# Patient Record
(demographics unavailable — no encounter records)

---

## 2025-03-01 NOTE — DVH
EXAM:  CT Abdomen and Pelvis Without Intravenous Contrast



CLINICAL INDICATION:   abd pain



TECHNIQUE:  Axial computed tomography images of the abdomen and pelvis without intravenous contrast. 
 This CT exam was performed using one or more of the following dose reduction techniques:  automated 
exposure control, adjustment of the mA and/or kV according to patient size, and/or use of iterative r
econstruction technique.



CONTRAST:       



COMPARISON:   CT ABD PELVIS WO CONTRAST on DOS: 8/10/21



FINDINGS:



 LUNG BASES:  See below.  



 PLEURAL SPACE:  Right pleural effusion with compressive atelectasis.



 MEDIASTINUM:  Small hiatal hernia.



ABDOMEN:



 LIVER:  See below.  



 GALLBLADDER AND BILE DUCTS:  Gallbladder is surgically absent.  No ductal dilation.



 PANCREAS:  Unremarkable.  No ductal dilation.



 SPLEEN:  Unremarkable.  No splenomegaly.



 ADRENALS:  Unremarkable.  No mass.



 KIDNEYS AND URETERS:  Unremarkable.  No obstructing stones.  No hydronephrosis.



 STOMACH AND BOWEL:  Apparent gastric wall thickening could be secondary to under distention or gastr
itis.  Fecal retention in the colon consistent with constipation.



PELVIS:



 APPENDIX:  No findings to suggest acute appendicitis.



 BLADDER:  Unremarkable.  No stones.



 REPRODUCTIVE:  Unremarkable as visualized.



ABDOMEN and PELVIS:



 INTRAPERITONEAL SPACE:  Hepatic surface irregularity with ascites, concerning for cirrhosis.  No napoleon
e air.



 BONES/JOINTS:  No acute fracture.  No dislocation.



 SOFT TISSUES:  Unremarkable.



 VASCULATURE:  Unremarkable.  No abdominal aortic aneurysm.



 LYMPH NODES:  Unremarkable.  No enlarged lymph nodes.



 OTHER FINDINGS:  .



IMPRESSION:     



1.  Hepatic surface irregularity with ascites, concerning for cirrhosis.



2.  Apparent gastric wall thickening could be secondary to under distention or gastritis.



3.  Fecal retention in the colon consistent with constipation.



4.  Small hiatal hernia.



5.  Right pleural effusion with compressive atelectasis.



Electronically Signed by: Tone Alamo at 03/01/2025 12:06:43 PM

## 2025-03-01 NOTE — ED.PDOC
GI ASSESSMENT


HPI Comments


68-year-old female brought in by EMS presents with a chief complaint of 

abdominal pain, diarrhea, and SOB. Patient has PMHx of Liver Cirrhosis and 

Kidney Failure. Patient reports that she was recently discharged from [facility] after a 2 week stay and 2 paracentesis. Patients 

abdomen is noted to be distended at this time.


Chief Complaint:  Abdominal Pain


Time Seen by MD:  10:45


Primary Care Provider:  SILVANA Borden Notes:  Medications, Allergies


Allergies:  


Coded Allergies:  


     NO KNOWN ALLERGIES (Unverified , 8/10/21)


Home Meds


Active Scripts


Prednisone (Prednisone) 20 Mg Tab, 20 MG PO DAILY for 30 Days, #30 MG


   Prov:HAMILTON LAMAS RESIDENT         24


Midodrine HCl (Midodrine Hydrochloride) 10 Mg Tab, 10 MG PO TID for 30 Days, #90

TAB 3 Refills


   Prov:HAMILTON LAMAS RESIDENT         24


Pantoprazole Sodium Sesquihydr (Protonix) 40 Mg Tab, 40 MG PO DAILY for 30 Days,

#30 TAB


   Prov:HAMILTON LAMAS RESIDENT         24


Reported Medications


Furosemide (Furosemide) 20 Mg Tab, 1 TAB PO TID for 30 Days, #90


   25


Sucralfate (CARAFATE SUSP) 1 Gm/10 Ml Ss, 5 ML PO QID


   25


Hydroxyzine Hcl (Hydroxyzine Hcl) 25 Mg Tab, 2 TAB PO BID PRN for FOR ITCHING


   25


Propranolol HCl (Propranolol Hydrochloride) 10 Mg Tab, 1 TAB PO TID


   25


Rifaximin (Xifaxan) 550 Mg Tab, 1 TAB PO BID


   25


Spironolactone (Spironolactone) 25 Mg Tab, 2 TAB PO BID


   25


Ursodiol (Ursodiol) 500 Mg Tab, 1 TAB PO TID


   25


Information Source:  Patient, Relative (Child), Emergency Med Personnel


Mode of Arrival:  EMS


Timing:  Weeks


Duration:  Intermittent


Prehospital treatment:  None


Quality:  Aching


Vomitus:  None


Stool:  Watery, Brown


Severity:  Moderate


Recent:  None


Recent Hx of:  Liver Disease


Pain Location:  RLQ


Associated sign and symptoms:  Diarrhea, Abdominal Pain





Past Medical History


PAST MEDICAL HISTORY:  Anemia, Liver


Surgical History:  Denies all surgeries


GYN History:  No Pertinent GYN History





Family History


Family History:  Reviewed,noncontributory to illness





Social History


Smoker:  Non-Smoker


Alcohol:  Denies ETOH Use


Drugs:  Denies Drug Use


Lives In:  Home





Constitutional:  denies: chills, diaphoresis, fatigue, fever, malaise, sweats, 

weakness, others


EENTM:  denies: blurred vision, double vision, ear bleeding, ear discharge, ear 

drainage, ear pain, ear ringing, eye pain, eye redness, hearing loss, mouth 

pain, mouth swelling, nasal discharge, nose bleeding, nose congestion, nose pain

, photophobia, tearing, throat pain, throat swelling, voice changes, others


Respiratory:  reports: shortness of breath; denies: cough, hemoptysis, 

orthopnea, SOB at rest, SOB with excertion, stridor, wheezing, others


Cardiovascular:  denies: chest pain, dizzy spells, diaphoresis, Dyspnea on 

exertion, edema, irregular heart beat, left arm pain, lightheadedness, 

palpitations, PND, syncope, others


Gastrointestinal:  reports: abdomen distended, abdominal pain, diarrhea; denies:

blood streaked bowels, constipated, dysphagia, difficulty swallowing, 

hematemesis, melena, nausea, poor appetite, poor fluid intake, rectal bleeding, 

rectal pain, vomiting, others


Genitourinary:  denies: abnormal vagina bleeding, burning, dyspareunia, dysuria,

flank pain, frequency, hematuria, incontinence, pain, pregnant, vagina 

discharge, urgency, others


Neurological:  denies: dizziness, fainting, headache, left sided numbness, left 

sided weakness, numbness, paresthesia, pre-existing deficit, right sided 

numbness, right sided weakness, seizure, speech problems, tingling, tremors, 

weakness, others


Musculoskeletal:  denies: back pain, gout, joint pain, joint swelling, muscle 

pain, muscle stiffness, neck pain, others


Integumetry:  denies: bruises, change in color, change in hair/nails, dryness, 

laceration, lesions, lumps, rash, wounds, others


Allergic/Immunocompromised:  denies: Difficulty Healing, Frequent Infections, H

syd, Itching, others


Hematologic/Lymphatic:  denies: anemia, blood clots, easy bleeding, easy 

bruising, swollen glands, others


Endocrine:  denies: excessive hunger, excessive sweating, excessive thirst, 

excessive urination, flushing, intolerance to cold, intolerance to heat, 

unexplained weight gain, unexplained weight loss, others


Psychiatric:  denies: anxiety, bipolar disorder, depression, hopeless, panic 

disorder, schizophrenia, sleepless, suicidal, others


All Other Systems:  Reviewed and Negative





Physical Exam


General Appearance:  Moderate Distress, Normal


HEENT:  Normal ENT Inspection, Pharynx Normal, TMs Normal


Neck:  Full Range of Motion, Non-Tender, Normal, Normal Inspection


Respiratory:  Chest Non-Tender, Lungs Clear, No Accessory Muscle Use, No 

Respiratory Distress, Normal Breath Sounds


Cardiovascular:  No Edema, No JVD, No Murmur, No Gallop, Normal Peripheral 

Pulses, Regular Rate/Rhythm


Breast Exam:  Deferred


Gastrointestinal:  Distended, RLQ, Tenderness


Genitalia:  Deferred


Pelvic:  Deferred


Rectal:  Deferred


Extremities:  No calf tenderness, Normal capillary refill, Normal inspection, 

Normal range of motion, Non-tender, No pedal edema


Musculoskeletal :  


   Apperance:  Normal


Neurologic:  Alert, CNs II-XII nml as Tested, No Motor Deficits, Normal Affect, 

Normal Mood, No Sensory Deficits


Cerebellar Function:  Normal


Reflexes:  Normal


Skin:  Dry, Normal Color, Warm


Lymphatic:  No Adenopathy





Was a procedure done?


Was a procedure done?:  No





GI differential Dx


Differential Diagnosis:  Diverticular disease, Gastroenteritis, Pancreatitis, 

Bacterial, Viral, Anemia, Esophageal Varicies





X-Ray, Labs, Meds, VS





                                   Vital Signs








  Date Time  Temp Pulse Resp B/P (MAP) Pulse Ox O2 Delivery O2 Flow Rate FiO2


 


3/1/25 11:22  88 28 89/49    


 


3/1/25 10:24  89 28  94 Nasal Cannula* 4 36


 


3/1/25 10:23 98.1 96 28 90/54 (66) 95   





 98.1       


 


3/1/25 10:14 98.4 97 24 94/51 (65) 97   








                               Current Medications








 Medications


  (Trade)  Dose


 Ordered  Sig/Kiran


 Route  Start Time


 Stop Time Status Last Admin


 


 Ondansetron HCl


  (Zofran)  4 mg  ONCE  ONCE


 IV  3/1/25 11:15


 3/1/25 11:16 DC 3/1/25 11:21





 


 Morphine Sulfate  4 mg  ONCE  ONCE


 IV  3/1/25 11:15


 3/1/25 11:16 DC 3/1/25 11:22





 


 Sodium Chloride  1,000 ml @ 


 250 mls/hr  Q4H ONCE


 IV  3/1/25 11:15


 3/1/25 12:53 DC 3/1/25 11:22





 


 Albumin Human  250 ml @ 


 250 mls/hr  ONCE  ONCE


 IV  3/1/25 11:15


 3/1/25 12:14 DC 3/1/25 11:22








PATIENT: HELADIO FERRERAT: [id number]: [id number]


: 1957           LOC: OVERFLOW             ROOM / BED: [address] [place] [address] / 


AGE / SEX: 68 / F         ADM STATUS: ADM IN        SERVICE DT: 25 1101





ORDERING PHYSICIAN: MERLY TAPIA MD


PROCEDURE(s): ABPL - CT AB PEL WO CON-NO ORAL OR IV


REASON: abd pain


ORDER NUMBER(s): [id number], ACCESSION NUMBER(s): [id number]








EXAM:  CT Abdomen and Pelvis Without Intravenous Contrast





CLINICAL INDICATION:   abd pain





TECHNIQUE:  Axial computed tomography images of the abdomen and pelvis without 

intravenous contrast.  This CT exam was performed using one or more of the 

following dose reduction techniques:  automated exposure control, adjustment of 

the mA and/or kV according to patient size, and/or use of iterative 

reconstruction technique.





CONTRAST:       





COMPARISON:   CT ABD PELVIS WO CONTRAST on DOS: 8/10/21





FINDINGS:





 LUNG BASES:  See below.  





 PLEURAL SPACE:  Right pleural effusion with compressive atelectasis.





 MEDIASTINUM:  Small hiatal hernia.





ABDOMEN:





 LIVER:  See below.  





 GALLBLADDER AND BILE DUCTS:  Gallbladder is surgically absent.  No ductal 

dilation.





 PANCREAS:  Unremarkable.  No ductal dilation.





 SPLEEN:  Unremarkable.  No splenomegaly.





 ADRENALS:  Unremarkable.  No mass.





 KIDNEYS AND URETERS:  Unremarkable.  No obstructing stones.  No hydronephrosis.





 STOMACH AND BOWEL:  Apparent gastric wall thickening could be secondary to 

under distention or gastritis.  Fecal retention in the colon consistent with 

constipation.





PELVIS:





 APPENDIX:  No findings to suggest acute appendicitis.





 BLADDER:  Unremarkable.  No stones.





 REPRODUCTIVE:  Unremarkable as visualized.





ABDOMEN and PELVIS:





 INTRAPERITONEAL SPACE:  Hepatic surface irregularity with ascites, concerning 

for cirrhosis.  No free air.





 BONES/JOINTS:  No acute fracture.  No dislocation.





 SOFT TISSUES:  Unremarkable.





 VASCULATURE:  Unremarkable.  No abdominal aortic aneurysm.





 LYMPH NODES:  Unremarkable.  No enlarged lymph nodes.





 OTHER FINDINGS:  .





IMPRESSION:     





1.  Hepatic surface irregularity with ascites, concerning for cirrhosis.





2.  Apparent gastric wall thickening could be secondary to under distention or 

gastritis.





3.  Fecal retention in the colon consistent with constipation.





4.  Small hiatal hernia.





5.  Right pleural effusion with compressive atelectasis.





Electronically Signed by: Fabrizio Chaney at 2025 12:06:43 PM











DICTATED BY: FABRIZIO CHANEY MD


DICTATED DATE/TIME: 25 1206





SIGNED BY: FABRIZIO CHANEY MD


SIGNED DATE/TIME: 25 1206





PATIENT: ROSEANNE FERRERA: [id number]        UNIT: [id number]


: 1957           LOC: OVERFLOW             ROOM / BED: [address] [place] [address]


AGE / SEX: 68 / F         ADM STATUS: ADM IN        SERVICE DT: 25 0000





ORDERING PHYSICIAN: MERLY TAPIA MD


PROCEDURE(s): ABDL - ABDOMEN LIMITED


REASON: FLUID CHECK


ORDER NUMBER(s): [id number], ACCESSION NUMBER(s): [id number]








CLINICAL HISTORY:  68 years old, Female; FLUID CHECK.





TECHNIQUE:  Grayscale sonographic imaging of the abdomen was performed, to 

evaluate for ascites.





COMPARISON: US ABDOMEN LIMITED on DOS: 25, US LIVER on DOS: 25, 

ABDOMEN LIMITED on DOS: 21





FINDINGS:  Ascites visualized in all 4 quadrants.





IMPRESSION: 





Ascites visualized in all 4 quadrants.





Electronically Signed by: Eamon Ozuna at 2025 11:50:16 AM











DICTATED BY: EAMON OZUNA DO


DICTATED DATE/TIME: 25 1150





SIGNED BY: EAMON OZUNA DO


SIGNED DATE/TIME: 25 115








68-year-old female with a known history of cirrhosis presents here with low 

blood pressure and abdominal pain.  Family states she does have a history of 

chronically low blood pressure however despite her medications, her blood 

pressure continued to remain low which overnight and this morning.  Additionally

patient has been reporting abdominal pain all night and this morning.  On my 

evaluation patient had a soft grown.  She was tender on my examination.  Blood 

pressure was proximally 90 systolic.  I reviewed her + blood pressures, she does

run low and her blood pressures were proximally 96 systolic during her last 

admission.  This time blood work has been done which does not demonstrate a 

leukocytosis.  It does demonstrate evidence of anemia.  Also demonstrates 

hypokalemia of 3.  An evidence of acute kidney injury as well as transaminitis. 

I have given her  bolus IV and also 250 of albumin IV.  I have gone very 

slowly with the fluids and not given her 30 cc/kilos bolus as I am concerned 

that she will become fluid overloaded.  Patient has tolerated fluids well in his

blood pressure has increased appropriately into the low 100s.  At this time 

blood work also has returned with a lactic acid of 2.5 however after fluids it 

has improved to 2.1.  I considered possible sepsis, considered possible 

spontaneous bacterial peritonitis.  However she does have a normal WBC count.  I

did order a paracentesis to be performed however IR not available today.  

However I do not have SIRS criteria at this time.  CT abdomen pelvis has been 

done by myself which demonstrates evidence of constipation which could be the 

cause of her current pain.  Patient's abdominal pain improved significantly with

morphine 1 mg IV.  At this time hospitalist team has been contacted for 

admission.


Time of 1ST Reevaluation:  11:15


Reevaluation 1ST:  Unchanged


Patient Education/Counseling:  Diagnosis, Treatment, Prognosis


Family Education/Counseling:  Diagnosis, Treatment, Prognosis





Departure 1


Departure


Time of Disposition:  11:30


Impression:  


   Primary Impression:  


   Cirrhosis of liver


   Qualified Codes:  K74.5 - Biliary cirrhosis, unspecified


   Additional Impressions:  


   Hypotension


   Qualified Codes:  I95.9 - Hypotension, unspecified


   Abdominal pain


   Qualified Codes:  R10.84 - Generalized abdominal pain


   Hypokalemia


   ANA (acute kidney injury)


   Lactic acidosis


   Transaminitis


   Constipation


   Qualified Codes:  K59.00 - Constipation, unspecified


Disposition:  09 ADMITTED AS INPATIENT


Admit to:  ICU


Condition:  Critical





Critical Care Note


Critical Care Time?:  Yes (35 min)


Critical care comment:


Patient was immediately evaluated by myself upon her arrival to the ER due to 

her low blood pressures.  Patient required multiple reassessments time spent 

speaking to family, ordering and reviewing all lab results.  Speaking to 

admitting physician multiple reassessments of her blood pressure and her 

condition.





Stability


Stability form required:  No





Heart Score


Heart Score:  








Heart Score Response (Comments) Value


 


History N/A 0


 


EKG N/A 0


 


Age N/A 0


 


Risk Factors N/A 0


 


Troponin N/A 0


 


Total  0














I personally scribed for MERLY TAPIA MD ([email or web address]) on 3/1/25 at 11:02.  

Electronically submitted by Tanner Friedman ([email or web address]).


I personally scribed for MERLY TAPIA MD ([email or web address]) on 3/1/25 at 17:27.  

Electronically submitted by Tanner Friedman ([email or web address]).


I personally scribed for MERLY TAPIA MD ([email or web address]) on 3/1/25 at 17:34.  

Electronically submitted by Tanner Friedman ([email or web address]).





MERLY TAPIA MD          Mar 1, 2025 11:02

## 2025-03-01 NOTE — DVHHP2
History of Present Illness


Reason for Visit:  Abdominal pain


History of Present Illness


Mahnaz Mckenna is a 68-year-old female with past medical history of liver 

cirrhosis autoimmune, kidney failure, and bilateral DVTs with IVC filter placed 

who presents to the ED with abdominal pain, distention, diarrhea, and shortness 

of breath.  Daughter at bedside Mi states that she was at [facility] for

5 days and had a paracentesis with 4 L out.  Prior to that she was here at 

[facility] for 2 weeks and had 2 paracentesis with 4 L out each time with a 

total of 8 L out.  Patient's daughter Mi states that she has a total of 3 

paracentesis with the 1st being at [facility].  Patient's daughter also 

reports that she does not use home oxygen but upon examination patient is on 

oxygen.  Patient's daughter also reports that her blood pressure has been very 

low and she has been giving her midodrine with no change.  Patient's daughter 

also reports that the patient has been lethargic and has lost her appetite the 

last day.  Patient is not currently complaining of chest pain, shortness of 

breath, fever, chills, nausea, and vomiting.


Hepatobiliary:  Cirrhosis


Renal/:  Chronic renal failure


Past Medical History


Bilateral DVTs


Past Surgical History:  Other (IVC filter)


Family History:  Arthritis, Cancer, DM, Other (Sister with kidney disease, mom w

ith breast cancer, dad with arthritis, and brother with diabetes now )


Smoke:  No


ALCOHOL:  none


Drugs:  None


Lives:  with Family


Domestic Violence:  Neg





Review of Systems


Respiratory:  Shortness of breath


Gastrointestinal:  Abdominal Pain, Diarrhea


Allergies:  


Coded Allergies:  


     NO KNOWN ALLERGIES (Unverified , 8/10/21)





Exam


Vital Signs





Vital Signs








  Date Time  Temp Pulse Resp B/P (MAP) Pulse Ox O2 Delivery O2 Flow Rate FiO2


 


3/1/25 11:22  88 28 89/49    


 


3/1/25 10:24     94 Nasal Cannula* 4 36


 


3/1/25 10:23 98.1       





 98.1       








Respiratory:  Normal air movement


Cardiovascular:  Normal S1, Normal S2, No murmurs


Skin:  No significant lesion


Neuro:  Sensation intact





Labs/Xrays





CLINICAL HISTORY:  68 years old, Female; FLUID CHECK.





TECHNIQUE:  Grayscale sonographic imaging of the abdomen was performed, to 

evaluate for ascites.





COMPARISON: US ABDOMEN LIMITED on DOS: 25, US LIVER on DOS: 25, 

ABDOMEN LIMITED on DOS: 21





FINDINGS:  Ascites visualized in all 4 quadrants.





IMPRESSION: 





Ascites visualized in all 4 quadrants.











EXAM:  CT Abdomen and Pelvis Without Intravenous Contrast





CLINICAL INDICATION:   abd pain





TECHNIQUE:  Axial computed tomography images of the abdomen and pelvis without 

intravenous contrast.  This CT exam was performed using one or more of the 

following dose reduction techniques:  automated exposure control, adjustment of 

the mA and/or kV according to patient size, and/or use of iterative 

reconstruction technique.





CONTRAST:       





COMPARISON:   CT ABD PELVIS WO CONTRAST on DOS: 8/10/21





FINDINGS:





 LUNG BASES:  See below.  





 PLEURAL SPACE:  Right pleural effusion with compressive atelectasis.





 MEDIASTINUM:  Small hiatal hernia.





ABDOMEN:





 LIVER:  See below.  





 GALLBLADDER AND BILE DUCTS:  Gallbladder is surgically absent.  No ductal 

dilation.





 PANCREAS:  Unremarkable.  No ductal dilation.





 SPLEEN:  Unremarkable.  No splenomegaly.





 ADRENALS:  Unremarkable.  No mass.





 KIDNEYS AND URETERS:  Unremarkable.  No obstructing stones.  No hydronephrosis.





 STOMACH AND BOWEL:  Apparent gastric wall thickening could be secondary to 

under distention or gastritis.  Fecal retention in the colon consistent with 

constipation.





PELVIS:





 APPENDIX:  No findings to suggest acute appendicitis.





 BLADDER:  Unremarkable.  No stones.





 REPRODUCTIVE:  Unremarkable as visualized.





ABDOMEN and PELVIS:





 INTRAPERITONEAL SPACE:  Hepatic surface irregularity with ascites, concerning 

for cirrhosis.  No free air.





 BONES/JOINTS:  No acute fracture.  No dislocation.





 SOFT TISSUES:  Unremarkable.





 VASCULATURE:  Unremarkable.  No abdominal aortic aneurysm.





 LYMPH NODES:  Unremarkable.  No enlarged lymph nodes.





 OTHER FINDINGS:  .





IMPRESSION:     





1.  Hepatic surface irregularity with ascites, concerning for cirrhosis.





2.  Apparent gastric wall thickening could be secondary to under distention or 

gastritis.





3.  Fecal retention in the colon consistent with constipation.





4.  Small hiatal hernia.





5.  Right pleural effusion with compressive atelectasis.





Assessment/Plan


Assessment/Plan


Assessment 


Intractable abdominal pain likely due to ascites


?Gastritis


Small hiatal hernia


Right pleural effusion


History of liver cirrhosis autoimmune


History of kidney failure 


History of bilateral DVTs status post IVC filter








Plan


Admit to ICU


Pressor


IV fluids given in ED 


Antiemetics


Pain management 


Ultrasound abdomen 


Paracentesis ordered 


Albumin 


Lactic level 


Urine culture


Blood culture


PT/PTT


CT abdomen and pelvis 


Lipase


Ascites fluid culture


T bili 


IV antibiotics-Zosyn 


Ammonia level 


PT INR 


Diuretics-Lasix + spironolactone


Continue home medications


Diet


Supplementary O2








Plan discussed with:  Daughter


My Orders





                           Orders - THON,SALINA K FNP








Procedure Category Date Status





  Time 


 


Comprehensive LAB 3/1/25 Logged





Metabolic Panel  11:19 


 


Lipase LAB 3/1/25 Logged





  11:19 


 


Amylase LAB 3/1/25 Logged





  11:19 


 


Body Fluid Culture W/ EMEKA 3/1/25 Logged





GS  11:19 











Date of Service:  Mar 1, 2025


Billing Provider:  HEATHER MUÑOZ


Common Visit Codes:  99223-INITIAL  INP/OBS CARE (HIGH)











HEATHER MUÑOZ               Mar 1, 2025 12:33

## 2025-03-01 NOTE — DVH
CLINICAL HISTORY:  68 years old, Female; FLUID CHECK.



TECHNIQUE:  Grayscale sonographic imaging of the abdomen was performed, to evaluate for ascites.



COMPARISON: US ABDOMEN LIMITED on DOS: 2/12/25, US LIVER on DOS: 1/22/25, ABDOMEN LIMITED on DOS: 8/1
1/21



FINDINGS:  Ascites visualized in all 4 quadrants.



IMPRESSION: 



Ascites visualized in all 4 quadrants.



Electronically Signed by: Eamon Ozuna at 03/01/2025 11:50:16 AM

## 2025-03-01 NOTE — DVHINCON2
Date of service:  Mar 1, 2025


Referring Physician


Natacha Moore





Reason for Consultation


Elevated liver tests





History of Present Illness


Mahnaz Mckenna is a 68-year-old female with past medical history of liver 

cirrhosis autoimmune, kidney failure, and bilateral DVTs with IVC filter placed 

who presents to the ED with abdominal pain, distention, diarrhea, and shortness 

of breath.  Daughter at bedside Mi states that she was at [facility] for

5 days and had a paracentesis with 4 L out.  Prior to that she was here at 

[facility] for 2 weeks and had 2 paracentesis with 4 L out each time with a 

total of 8 L out.  Patient's daughter Mi states that she has a total of 3 

paracentesis with the 1st being at [facility].  Patient was seen in my office

and I believe I have arranged her to get outpatient periodic paracentesis on a 

regular basis.  Patient's daughter also reports that she does not use home 

oxygen but upon examination patient is on oxygen.  Patient's daughter also 

reports that her blood pressure has been very low and she has been giving her 

midodrine with no change.  Patient's daughter also reports that the patient has 

been lethargic and has lost her appetite the last day.





Past Medical History


Hepatobiliary:  Cirrhosis


Renal/:  Chronic renal failure


Past Medical History


Bilateral DVTs





Past Surgical History


Past Surgical History:  Other (IVC filter)





Family History:  


FH: breast cancer


  G8 MOTHER


Scleroderma


  G8 FATHER, 





Allergies:  


Coded Allergies:  


     NO KNOWN ALLERGIES (Unverified , 8/10/21)


Home Meds


Active Scripts


Prednisone (Prednisone) 20 Mg Tab, 20 MG PO DAILY for 30 Days, #30 MG


   Prov:HAMILTON LAMAS RESIDENT         24


Midodrine HCl (Midodrine Hydrochloride) 10 Mg Tab, 10 MG PO TID for 30 Days, #90

TAB 3 Refills


   Prov:HAMILTON LAMAS RESIDENT         24


Pantoprazole Sodium Sesquihydr (Protonix) 40 Mg Tab, 40 MG PO DAILY for 30 Days,

#30 TAB


   Prov:HAMILTON LAMAS RESIDENT         24


Reported Medications


Furosemide (Furosemide) 20 Mg Tab, 1 TAB PO TID for 30 Days, #90


   25


Sucralfate (CARAFATE SUSP) 1 Gm/10 Ml Ss, 5 ML PO QID


   25


Hydroxyzine Hcl (Hydroxyzine Hcl) 25 Mg Tab, 2 TAB PO BID PRN for FOR ITCHING


   25


Propranolol HCl (Propranolol Hydrochloride) 10 Mg Tab, 1 TAB PO TID


   25


Rifaximin (Xifaxan) 550 Mg Tab, 1 TAB PO BID


   25


Spironolactone (Spironolactone) 25 Mg Tab, 2 TAB PO BID


   25


Ursodiol (Ursodiol) 500 Mg Tab, 1 TAB PO TID


   25


Current Medications





                               Current Medications








 Medications


  (Trade)  Dose


 Ordered  Sig/Kiran


 Route


 PRN Reason  Start Time


 Stop Time Status Last Admin


 


 Norepinephrine


 Bitartrate  250 ml @ 


 3.75 mls/hr  Q24H


 IV


   3/1/25 11:30


     





 


 Ondansetron HCl


  (Zofran)  4 mg  Q4HP  PRN


 IV


 NAUSEA / VOMITING  3/1/25 11:30


     





 


 Enoxaparin Sodium


  (Lovenox)  30 mg  DAILY


 SC


   3/2/25 10:00


 3/1/25 13:00 DC  





 


 Acetaminophen


  (Tylenol Tablet)  650 mg  Q6HP  PRN


 PO


 PAIN SCALE 1-3  OR TEMP>100.4  3/1/25 11:30


     





 


 Furosemide


  (Lasix Tablet)  20 mg  TID


 PO


   3/1/25 14:00


    3/1/25 14:24





 


 Rifaximin


  (Xifaxan)  550 mg  BID


 PO


   3/1/25 22:00


     





 


 Sucralfate


  (Carafate Susp)  0.5 gm  QID


 PO


   3/1/25 12:00


    3/1/25 12:00





 


 Hydroxyzine


 Pamoate


  (Vistaril Oral)  25 mg  BID  PRN


 PO


 FOR ITCHING  3/1/25 13:00


     





 


 Patient Own


 Medication  1 tab  TID


 PO


   3/1/25 14:00


     





 


 Piperacillin Sod/


 Tazobactam Sod  100 ml @ 


 25 mls/hr  Q8HR


 IV


   3/1/25 14:00


    3/1/25 13:59





 


 Spironolactone


  (Aldactone)  25 mg  DAILY


 PO


   3/2/25 10:00


     





 


 Potassium Chloride  100 ml @ 


 50 mls/hr  Q2H


 IV


   3/1/25 13:00


 3/1/25 16:59 DC 3/1/25 15:13














Vital Signs





                                   Vital Signs








  Date Time  Temp Pulse Resp B/P (MAP) Pulse Ox O2 Delivery O2 Flow Rate FiO2


 


3/1/25 16:45  75 12 113/82 (92) 98   


 


3/1/25 16:32      Nasal Cannula* 2 28


 


3/1/25 15:45 98.1       





 98.1       








Physical Exam


Patient seen at bedside, awake and arousable but lethargic


Mild scleral icterus improved from before


Respiratory:  Normal air movement


Cardiovascular:  Normal S1, Normal S2, No murmurs


Abdomen is soft slightly distended but there was no significant ascites or 

tenderness at this time


Skin:  No significant lesion


Neuro:  Sensation intact





Labs/Diagnostic Data





                                      Labs








Test


 3/1/25


12:05 3/1/25


11:33 Range/Units


 


 


Lactic Acid Level 2.1 *H  0.4-2.0  mmol/L


 


Total Bilirubin 3.1 H  0.2-1.0  mg/dL


 


Ammonia < 10 L  11-32  umol/L


 


White Blood Count


 


 9.4 


 4.4-10.8


10^3/uL


 


Red Blood Count


 


 4.75 


 4.0-5.20


10^6/uL


 


Hemoglobin  13.4  12.2-16.2  g/dL


 


Hematocrit  42.3  36.0-46.0  %


 


Mean Corpuscular Volume  88.9  80.0-100.0  fL


 


Mean Corpuscular Hemoglobin  28.2  28.0-32.0  pg


 


Mean Corpuscular Hemoglobin


Concent 


 31.7 L


 32.0-36.0  g/dL





 


Red Cell Distribution Width  19.8 H 11.8-14.3  %


 


Platelet Count


 


 60 L


 140-450


10^3/uL


 


Mean Platelet Volume  8.5  6.9-10.8  fL


 


Neutrophils (%) (Auto)  87.0 H 37.0-80.0  %


 


Lymphocytes (%) (Auto)  5.4 L 10.0-50.0  %


 


Monocytes (%) (Auto)  5.7  0.0-12.0  %


 


Eosinophils (%) (Auto)  1.2  0.0-7.0  %


 


Basophils (%) (Auto)  0.7  0.0-2.0  %


 


Neutrophils # (Auto)


 


 8.2 


 1.6-8.6  10


^3/uL


 


Lymphocytes # (Auto)


 


 0.5 


 0.4-5.4  10


^3/uL


 


Monocytes # (Auto)  0.5  0-1.3  10 ^3/uL


 


Eosinophils # (Auto)  0.1  0-0.8  10 ^3/uL


 


Basophils # (Auto)  0.1  0-0.2  10 ^3/uL


 


Nucleated Red Blood Cells  0.1   %


 


Prothrombin Time  11.9 H 9.3-11.8  sec


 


Prothrombin Time INR  1.14  0.9-1.15  


 


Activated Partial


Thromboplast Time 


 26.2 


 24.5-34.5  SEC





 


Sodium Level  134 L 136-145  mmol/L


 


Potassium Level  3.0 L 3.5-5.1  mmol/L


 


Chloride Level  102    mmol/L


 


Carbon Dioxide Level  21  20-31  mmol/L


 


Anion Gap  11  5-15  


 


Blood Urea Nitrogen  29 H 9-23  mg/dL


 


Creatinine


 


 1.34 H


 0.550-1.02


mg/dL


 


Glomerular Filtration Rate


Calc 


 43 


 >90  mL/min





 


BUN/Creatinine Ratio  21.6 H 10.0-20.0  


 


Serum Glucose  99    mg/dL


 


Calcium Level  9.2  8.7-10.4  mg/dL


 


Aspartate Amino Transferase


(AST) 


 101 H


 13-40  U/L





 


Alanine Aminotransferase (ALT)  106 H 7-40  U/L


 


Alkaline Phosphatase  360 H   U/L


 


Total Protein  6.0  5.7-8.2  g/dL


 


Albumin  3.6  3.2-4.8  g/dL


 


Amylase Level  97    U/L


 


Lipase  58 H 12-53  U/L





CT SCAN ABD


IMPRESSION:     





1.  Hepatic surface irregularity with ascites, concerning for cirrhosis.





2.  Apparent gastric wall thickening could be secondary to under distention or 

gastritis.





3.  Fecal retention in the colon consistent with constipation.





4.  Small hiatal hernia.





5.  Right pleural effusion with compressive atelectasis.





Problems(with codes):  


(1) ANA (acute kidney injury)


(2) Transaminitis


(3) Abdominal pain


(4) Cirrhosis of liver


(5) Lactic acidosis


(6) Constipation


(7) Autoimmune hepatitis


(8) Ascites


Plan/Recommendation


Plan





Continue supportive care 


Patient's lab tests appeared to be similar to her recent discharge lab tests and

have not worsened


Monitor labs including ammonia level


Gentle IV fluid hydration 


Prednisone 20 mg p.o. daily


Lactulose 30 mL p.o. twice a day


Protonix 40 mg p.o. daily


Supportive care


We should consider placement


Plan discussed with:  Patient, Other (ER Nurse)











PAL MAN MD                 Mar 1, 2025 18:23

## 2025-03-02 NOTE — DVHPN2
Progress Note - Dictate


Date Seen:  Mar 2, 2025


Medical Necessity Reason


Pt with a Central, PICC or Fol:  No


Subjective


Patient seen at bedside in ER bed six 


She is sleeping comfortably 


There was no active GI bleeding reported 


Her liver enzymes are elevated but stable


vital signs





                                   Vital Sign








  Date Time  Temp Pulse Resp B/P (MAP) Pulse Ox O2 Delivery O2 Flow Rate FiO2


 


3/2/25 13:20  88      


 


3/2/25 12:30   16 87/43 (58) 100   


 


3/2/25 07:30      Nasal Cannula* 2 28


 


3/2/25 07:15 98.2       





 98.2       














                           Total Intake and Output   


 


 3/1/25 3/1/25 3/2/25





 14:59 22:59 06:59


 


Intake Total 700 ml 228.75 ml 100 ml


 


Output Total  260 ml 


 


Balance 700 ml -31.25 ml 100 ml








medications





                               Current Medications








 Medications  Dose


 Ordered  Sig/Kiran


 Route  Start Time


 Stop Time Status Last Admin


Dose Admin


 


 Norepinephrine


 Bitartrate  250 ml @ 


 3.75 mls/hr  Q24H


 IV  3/1/25 11:30


    3/2/25 12:00


18.75 MLS/HR


 


 Ondansetron HCl  4 mg  Q4HP  PRN


 IV  3/1/25 11:30


     





 


 Acetaminophen  650 mg  Q6HP  PRN


 PO  3/1/25 11:30


     





 


 Furosemide  20 mg  TID


 PO  3/1/25 14:00


    3/2/25 06:37


20 MG


 


 Rifaximin  550 mg  BID


 PO  3/1/25 22:00


    3/2/25 10:37


550 MG


 


 Sucralfate  0.5 gm  QID


 PO  3/1/25 12:00


    3/2/25 12:03


0.5 GM


 


 Hydroxyzine


 Pamoate  25 mg  BID  PRN


 PO  3/1/25 13:00


     





 


 Patient Own


 Medication  1 tab  TID


 PO  3/1/25 14:00


     





 


 Piperacillin Sod/


 Tazobactam Sod  100 ml @ 


 25 mls/hr  Q8HR


 IV  3/1/25 14:00


    3/2/25 06:36


25 MLS/HR


 


 Spironolactone  25 mg  DAILY


 PO  3/2/25 10:00


    3/2/25 10:37


25 MG


 


 Pantoprazole


 Sodium  40 mg  DAILY@0600


 PO  3/2/25 06:00


    3/2/25 06:37


40 MG


 


 Prednisone  15 mg  DAILY


 PO  3/2/25 10:00


    3/2/25 10:37


15 MG








objective


Patient seen at bedside, awake and arousable but lethargic


Mild scleral icterus improved from before


Respiratory:  Normal air movement


Cardiovascular:  Normal S1, Normal S2, No murmurs


Abdomen is soft slightly distended but there was no significant ascites or 

tenderness at this time


Skin:  No significant lesion


Neuro:  Sensation intact


laboratory and microbiology


                                Laboratory Tests


3/2/25 08:33








3/2/25 06:12

















Test


 3/2/25


06:12 Range/Units


 


 


Serum Glucose 96    mg/dL








Problems(with codes):  


(1) Autoimmune hepatitis


(2) Ascites


(3) Transaminitis


(4) Abdominal pain


(5) Hypotension


(6) Cirrhosis of liver


(7) Lactic acidosis


Prognosis


Plan





Patient is awaiting a paracentesis 


Continue oral prednisone 20 mg p.o. twice a day


Continue ursodiol 300 mg p.o. twice a day


Continue to monitor labs


Gentle IV fluid hydration


IV antibiotics


Advance diet as tolerated


Plan discussed with:  Patient, Other (ER Nurse)











PAL MAN MD                 Mar 2, 2025 14:35

## 2025-03-02 NOTE — DVHNC2
Procedure


-


ULTRASOUND-GUIDED LEFT INTERNAL JUGULAR CENTRAL VENOUS CANNULATION


CPT Codes: 


38190 (ultrasound guidance)


21131 (insertion of non-tunneled centrally inserted central venous catheter)


84373 (CXR interpretation)





Time out time:


Patient medications and allergies reviewed.  The risks and benefits of the 

procedure and the sedation options and risk were discussed with the patient's 

healthcare proxy.  All questions were answered and informed consent was 

obtained.  Patient identification and proposed procedure were verified prior to 

the procedure by the physician, and a nurse in the patient's room.  The heart 

rate, respiratory rate, oxygen saturations, blood pressure, adequacy of 

pulmonary ventilation, and response to care were monitored throughout the 

procedure.  The physical status of the patient was reassessed after the 

procedure.





DATE: 03/02/2025


PHYSICIAN: Talya Guzman


PREOPERATIVE DIAGNOSIS: Septic shock   


POSTOPERATIVE DIAGNOSIS: Septic shock





PROCEDURE PERFORMED: Limited Ultrasound-guided LEFT internal jugular central 

line placement.


ANESTHESIA: 2 mL of 1% lidocaine plain.


ESTIMATED BLOOD LOSS: less than 5 mL.


SPECIMENS: None.


COMPLICATIONS: None.





INDICATIONS FOR PROCEDURE: The patient is in need of large bore IV access for 

administration of fluids, including blood products and vasoactive drugs, 

possible transvenous cardiac pacing and CVP monitoring for hemodynamic 

instability.





DESCRIPTION OF PROCEDURE IN DETAIL:


The patient was lying in the Trendelenburg position with head turned 30 degrees 

away from the insertion site. The skin was thoroughly sponged with chlorhexidine

and allowed to dry. All persons involved were shielded with hair nets, face 

masks and sterile gowns. With sterile-gloved hands the LEFT neck area was draped

with the large disposable sterile field provided in the pre-manufactured kit. 

The skin and subcutaneous tissues superficial to the LEFT internal jugular vein 

were anesthetized with 2 mL of 1% lidocaine. 





The LEFT internal jugular vein was identified on ultrasound from the angle of 

the mandible down into the supraclavicular fossa using the linear ultrasound 

probe in the transverse orientation. The carotid artery was identified and 

avoided utilizing color-flow. The internal jugular vein was then placed in the 

center of the ultrasound field and compressed for patency. A movement artifact 

was identified as the needle was advanced through the skin and advanced toward 

the vessel. A real time hyperechoic signal revealed visualization of vascular 

needle entry into the lumen as blood was noted to flashback in the syringe. The 

needle was then held in place while the guide wire was advanced. The needle was 

then removed. Direct visualization of guide wire location within the vein was 

noted on ultrasound indicating proper placement and was document in the 

electronic medical record chart. A skin dilator was advanced over the guidewire 

and removed, and the triple-lumen catheter was then advanced over the guide wire

into proper position. The guide wire was removed and discarded. The ports were 

aspirated which showed good blood return and then carefully flushed with normal 

saline. The catheter was stabilized and sutured to the skin with 2-0 silk at 4 

anchor points. A sterile bio-patch and dressing was placed over the catheter, 

including the insertion site. The patient tolerated the procedure well. 





A chest x-ray was ordered for position confirmation. Post procedure CXR is 

pending at time of writing this note.











TALYA GUZMAN MD              Mar 2, 2025 14:42

## 2025-03-02 NOTE — DVH
[facility]  



                          [address] [place] [address]  



                                       Ph: [phone]  



 



                                        DIAGNOSTIC IMAGING  



                               Diagnostic Imaging Report : [id number]  



                                        Signed with Real  



 



 



PATIENT: ANURAG FERRERA       ACCT: [id number]               UNIT: [id number]  



: 1957                  LOC: OVERFLOW                    ROOM / BED: [address] [place] [address] /   



AGE / SEX: 68 / F                ADM STATUS: ADM IN               SERVICE DT: 25  



 



ORDERING PHYSICIAN: TALYA DIAZ MD  



PROCEDURE(s): CXR1 - CHEST XRAY 1 VIEW  



REASON: CENTRAL LINE PLACEMENT  



ORDER NUMBER(s): [id number], ACCESSION NUMBER(s): [id number]  



 



 



                        -------------------- ADDENDUM --------------------  



 



 



************ ADDENDUM # 1 ************  



 



Left internal jugular catheter in place in superior vena cava with the tip appears to be above the 



right atrium.  



 



HS:Y   



 



Electronically Signed by: Carlos Williamson at 2025 16:33:00 PM



 



 



 



******** ORIGINAL REPORT ********  



CHEST RADIOGRAPH  



 



Indication: CENTRAL LINE PLACEMENT  



 



Technique: Single frontal view of the chest was obtained  



 



Comparison: XY CHEST XRAY 1 VIEW on DOS: 25, XY CHEST PORTABLE on DOS: 25, XY CHEST PORTABLE



on DOS: 25  



 



FINDINGS:  



 



Lines and Tubes: None  



 



Lungs: Infiltrate or atelectasis right lower lobe.  



 



Pleura: No effusion.  



 



No pneumothorax.   



 



Cardiomediastinal contours: Unremarkable  



 



Bones: No acute osseous abnormality.  



 



IMPRESSION:   



 



1. Infiltrate and or atelectasis right lower lobe.  



 



HS:Y   



 



Electronically Signed by: Carlos Williamson at 2025 16:33:00 PM



 



 



 



DICTATED BY: CARLOS WILLIAMSON Jr., DO  



DICTATED DATE/TIME: 25  



 



SIGNED BY: CARLOS WILLIAMSON Jr., DO  



SIGNED DATE/TIME: 25  



 



CC:   



CHEST RADIOGRAPH  



 



Indication: CENTRAL LINE PLACEMENT  



 



Technique: Single frontal view of the chest was obtained  



 



Comparison: XY CHEST XRAY 1 VIEW on DOS: 25, XY CHEST PORTABLE on DOS: 25, XY CHEST PORTABLE



on DOS: 25  



 



FINDINGS:  



 



Lines and Tubes: None  



 



Lungs: Infiltrate or atelectasis right lower lobe.  



 



Pleura: No effusion.  



 



No pneumothorax.   



 



Cardiomediastinal contours: Unremarkable  



 



Bones: No acute osseous abnormality.  



 



IMPRESSION:   



 



1. Infiltrate and or atelectasis right lower lobe.  



 



HS:Y   



 



Electronically Signed by: Carlos Williamson at 2025 16:33:00 PM



 



 



 



DICTATED BY: CARLOS WILLIAMSON Jr., DO  



DICTATED DATE/TIME: 25 1458  



 



SIGNED BY: CARLOS WILLIAMSON Jr.,   



SIGNED DATE/TIME: 25 145  



 



CC:

## 2025-03-02 NOTE — DVHPN2
Subjective


3/2 continues to have pain the abdomen, no signs of acute abdomen.  Has mild 

amounts of ascitic fluid.  Patient has been on vasopressor support overnight.  

Appreciate PULM Dr. Guzman inserting left IJ Cvc.  We will continue broad-

spectrum antibiotics,.  Bedside paracentesis done with suspicion of SBP as 

source.  We will continue antibiotics and vasopressor support


Reviewed:  H&P


Changes from previous H/P or p:  No Changes


General:  Per HPI


Respiratory:  Shortness of breath


Gastrointestinal:  Abdominal Pain, Diarrhea





Objective


Vitals





Vital Signs








  Date Time  Temp Pulse Resp B/P (MAP) Pulse Ox O2 Delivery O2 Flow Rate FiO2


 


3/2/25 18:15  82 21 109/58 (75) 95   


 


3/2/25 15:45 97.6       





 97.6       


 


3/2/25 07:30      Nasal Cannula* 2 28








Intake/Output











                               Intake and Output 


 


 3/2/25





 07:00


 


Intake Total 1028.75 ml


 


Output Total 260 ml


 


Balance 768.75 ml


 


 


 


Intake Oral 100 ml


 


IV Total 928.75 ml


 


Output Urine Total 260 ml








Exam


GEN: Healthy appearing, well-developed, NAD.


HEENT: NC/AT; MMM. L IJ cvc


CV: RRR, no m/r/g.


LUNGS:  Bibasilar rales


ABD:  Mildly Distended abdomen, hypoactive bowel sounds, no rebound tenderness, 

no rigidity, no guarding,.


EXT: skin Warm, well perfused. no rashes. No clubbing, cyanosis, or edema.


NEURO: Ambulating with no limitations. No focal deficits.


Medications





                               Current Medications








 Medications  Dose


 Ordered  Sig/Kiran


 Route  Start Time


 Stop Time Status Last Admin


Dose Admin


 


 Norepinephrine


 Bitartrate  250 ml @ 


 3.75 mls/hr  Q24H


 IV  3/1/25 11:30


    3/2/25 12:00


18.75 MLS/HR


 


 Ondansetron HCl  4 mg  Q4HP  PRN


 IV  3/1/25 11:30


     





 


 Acetaminophen  650 mg  Q6HP  PRN


 PO  3/1/25 11:30


     





 


 Furosemide  20 mg  TID


 PO  3/1/25 14:00


    3/2/25 14:49


20 MG


 


 Rifaximin  550 mg  BID


 PO  3/1/25 22:00


    3/2/25 10:37


550 MG


 


 Sucralfate  0.5 gm  QID


 PO  3/1/25 12:00


    3/2/25 18:30


0.5 GM


 


 Hydroxyzine


 Pamoate  25 mg  BID  PRN


 PO  3/1/25 13:00


     





 


 Patient Own


 Medication  1 tab  TID


 PO  3/1/25 14:00


     





 


 Piperacillin Sod/


 Tazobactam Sod  100 ml @ 


 25 mls/hr  Q8HR


 IV  3/1/25 14:00


    3/2/25 14:40


25 MLS/HR


 


 Spironolactone  25 mg  DAILY


 PO  3/2/25 10:00


    3/2/25 10:37


25 MG


 


 Pantoprazole


 Sodium  40 mg  DAILY@0600


 PO  3/2/25 06:00


    3/2/25 06:37


40 MG


 


 Prednisone  15 mg  DAILY


 PO  3/2/25 10:00


    3/2/25 10:37


15 MG











Laboratory Results


Laboratory Tests


3/2/25 06:12








3/2/25 08:33











Chemistry








Test


 3/2/25


06:12


 


Albumin


 3.7 g/dL


(3.2-4.8)


 


Calcium Level


 9.5 mg/dL


(8.7-10.4)


 


Total Protein


 5.9 g/dL


(5.7-8.2)








LFT








Test


 3/2/25


06:12


 


Alanine Aminotransferase (ALT)


 81 U/L (7-40)


H


 


Alkaline Phosphatase


 302 U/L


()  H


 


Aspartate Amino Transferase


(AST) 79 U/L (13-40)


H


 


Total Bilirubin


 4.9 mg/dL


(0.2-1.0)  H








Microbiology





                                  Microbiology








 Date/Time


Source Procedure


Growth Status





 


 3/1/25 15:50


Voided Urine Urine Culture - Preliminary


 Resulted


 


 3/1/25 11:33


Blood Blood Culture - Preliminary


NO GROWTH AFTER 24 HOURS OF INCUBATION. Resulted








Labs and/or images reviewed:  Labs reviewed by me, Image(s) reviewed by me





Assessment/Plan


Assessment/Plan


3/2 continues to have pain the abdomen, no signs of acute abdomen.  Has mild 

amounts of ascitic fluid.  Patient has been on vasopressor support overnight.  

Appreciate PULM Dr. Guzman inserting left IJ Cvc.  We will continue broad-

spectrum antibiotics,.  Bedside paracentesis done with suspicion of SBP as 

source.  We will continue antibiotics and vasopressor support





Septic shock due to likely SBP versus GI source


History of autoimmune cirrhosis decompensated with ascites


Intractable abdominal pain likely due to ascites


ascites recurrent


?Gastritis


Small hiatal hernia


Right pleural effusion


ANA due to VMN likely


Lactic acidosis likely due to sepsis - on antibiotics


Thrombocytopenic - hold Lovenox


Neutrophilia


Hypokalemic - Replete lytes


Hyponatremia, hypervolemic likely cirrhosis


constipation


Transaminitis - GI onboard


History of kidney failure 


History of bilateral DVTs status post IVC filter








- vasopressor levophed w MAP goal >65, continue midodrine home med


- per GI: Monitor labs including ammonia level, Prednisone 20 mg p.o. daily


- Lactulose 30 mL p.o. twice a day


- Protonix 40 mg p.o. daily


- holding off propranolol given hypotension. holding antiHTN (aldactone)


- s/p IV lasix, holding further per GI rec.


- ANA improving despite diuresis. ssx improve with diuresis (abdominal pain from

ascites)


- continue prophylaxis for hepatic encephalopathy-continue lactulose and 

rifaximin


- Continue home ursodiol


- ascites fluid samples pending - color straw, noncloudy











diet - CLD


dvt ppx - scd


gi ppx - protonix


ICU time 75min


full code








========================================================


Paracentesis Procedure Note 


INDICATION: r/o SBP





PROCEDURE : Alanna Valiente MD


Ultrasound used to tariq location: Y





CONSENT: 


Consent was obtained from patient prior to the procedure. Indications, risks, 

and benefits were explained at length. 





PROCEDURE SUMMARY: 





A time-out was performed. My hands were washed immediately prior to the 

procedure. I wore a surgical cap, mask with protective eyewear, sterile gown and

sterile gloves throughout the procedure. The area was cleansed and draped in 

usual sterile fashion using chlorhexidine scrub. Anesthesia was achieved with 1%

lidocaine. The LLQ of the abdomen was prepped and draped in a sterile fashion 

using chlorhexidine scrub. 1% lidocaine was used to numb the skin, soft tissue 

and peritoneum. The paracentesis catheter was inserted and advanced with 

negative pressure until straw-colored fluid was aspirated. Approximately 60 mL 

of ascitic fluid was collected and sent for laboratory analysis. The catheter 

was then connected to the vaccutainer and 0.04 liters of additional ascitic 

fluid were drained. The catheter was removed and no leaking was noted. A bandaid

was placed over the puncture wound. The patient tolerated the procedure well 

without any immediate complications. Estimated blood loss was 0.5cc.





Alanna Valiente





Abdominal U/S CPT 19045


Paracentesis CPT 76022


Plan discussed with:  Patient


My Orders





                          Orders - ALANNA ARAIZA MD








Procedure Category Date Status





  Time 


 


*Consult CONS 3/2/25 Transmitted





/  13:12 


 


Body Fluid Culture W/ EMEKA 3/2/25 Transmitted





GS  18:44 


 


Body Fluids, Diff. LAB 3/2/25 Transmitted





Cell Count  18:44 


 


Protein, Body Fluid LAB 3/2/25 Transmitted





  18:44 


 


Albumin; Body Fluid LAB 3/2/25 Transmitted





  18:44 


 


Comprehensive LAB 3/2/25 Transmitted





Metabolic Panel  18:44 











Date of Service:  Mar 2, 2025


Billing Provider:  ALANNA ARAIZA MD


Common Visit Codes:  66625-[id number] CARE 30-74 MIN


Procedure Codes:  38259-[id number] W/IMAGING











ALANNA ARAIZA MD            Mar 2, 2025 19:10

## 2025-03-03 NOTE — DVHPN2
Subjective


Patient denies any symptoms.  Reports that her abdominal pain has improved


Reviewed:  H&P


Changes from previous H/P or p:  No Changes


General:  Per HPI


Respiratory:  Shortness of breath


Gastrointestinal:  Abdominal Pain, Diarrhea





Objective


Vitals





Vital Signs








  Date Time  Temp Pulse Resp B/P (MAP) Pulse Ox O2 Delivery O2 Flow Rate FiO2


 


3/3/25 08:00  74      


 


3/3/25 07:45   20 103/52 (69) 93   


 


3/3/25 06:00      Room Air* 0 21


 


3/3/25 04:00 97.5       





 97.5       








Intake/Output











                               Intake and Output 


 


 3/3/25





 07:00


 


Intake Total 1051.25 ml


 


Output Total 466 ml


 


Balance 585.25 ml


 


 


 


Intake Oral 600 ml


 


IV Total 451.25 ml


 


Output Urine Total 450 ml


 


Drainage Total 16 ml


 


# Voids 10


 


# Bowel Movements 4








General Appearance:  Alert, Oriented X3, Cooperative


HEENT:  Atraumatic, PERRLA


Lungs:  Clear to auscultation, Normal air movement


Cardiovascular:  Normal S1, Normal S2


Abdomen:  Normal bowel sounds, Soft, No tenderness, No hepatospenomegaly


Musculoskeletal:  Normal sensory function, Normal motor function


Skin:  Dry, Intact


Psych/Mental Status:  Mental status NL, Mood NL


Medications





                               Current Medications








 Medications  Dose


 Ordered  Sig/Kiran


 Route  Start Time


 Stop Time Status Last Admin


Dose Admin


 


 Norepinephrine


 Bitartrate  250 ml @ 


 3.75 mls/hr  Q24H


 IV  3/1/25 11:30


    3/2/25 12:00


18.75 MLS/HR


 


 Ondansetron HCl  4 mg  Q4HP  PRN


 IV  3/1/25 11:30


     





 


 Acetaminophen  650 mg  Q6HP  PRN


 PO  3/1/25 11:30


     





 


 Furosemide  20 mg  TID


 PO  3/1/25 14:00


    3/3/25 06:06


20 MG


 


 Rifaximin  550 mg  BID


 PO  3/1/25 22:00


    3/2/25 22:00


550 MG


 


 Sucralfate  0.5 gm  QID


 PO  3/1/25 12:00


    3/3/25 05:55


0.5 GM


 


 Hydroxyzine


 Pamoate  25 mg  BID  PRN


 PO  3/1/25 13:00


     





 


 Patient Own


 Medication  1 tab  TID


 PO  3/1/25 14:00


     





 


 Piperacillin Sod/


 Tazobactam Sod  100 ml @ 


 25 mls/hr  Q8HR


 IV  3/1/25 14:00


    3/3/25 05:50


25 MLS/HR


 


 Spironolactone  25 mg  DAILY


 PO  3/2/25 10:00


    3/2/25 10:37


25 MG


 


 Pantoprazole


 Sodium  40 mg  DAILY@0600


 PO  3/2/25 06:00


    3/3/25 06:06


40 MG


 


 Prednisone  15 mg  DAILY


 PO  3/2/25 10:00


    3/2/25 10:37


15 MG


 


 Lactulose  30 ml  BID


 PO  3/2/25 22:00


    3/2/25 22:00


30 ML











Laboratory Results


Laboratory Tests


3/3/25 03:47











Chemistry








Test


 3/3/25


03:47


 


Albumin


 3.4 g/dL


(3.2-4.8)


 


Calcium Level


 9.3 mg/dL


(8.7-10.4)


 


Total Protein


 5.7 g/dL


(5.7-8.2)








LFT








Test


 3/3/25


03:47


 


Alanine Aminotransferase (ALT)


 64 U/L (7-40)


H


 


Alkaline Phosphatase


 285 U/L


()  H


 


Aspartate Amino Transferase


(AST) 55 U/L (13-40)


H


 


Total Bilirubin


 3.4 mg/dL


(0.2-1.0)  H








Microbiology





                                  Microbiology








 Date/Time


Source Procedure


Growth Status





 


 3/1/25 15:50


Voided Urine Urine Culture - Preliminary


 Resulted


 


 3/1/25 11:33


Blood Blood Culture - Preliminary


NO GROWTH AFTER 24 HOURS OF INCUBATION. Resulted








Labs and/or images reviewed:  Labs reviewed by me, Image(s) reviewed by me





Assessment/Plan


Assessment/Plan


Impression:


-septic shock


-community-acquired pneumonia, probable Gram-positive/Gram-negative etiology.  


-cirrhosis of the liver with portal hypertension


-abdominal pain


-obesity 


-thrombocytopenia


-leukocytosis 


-rule out gastritis





Plan:


-wean norepinephrine drip


-start midodrine


-continue antibiotic therapy with Zosyn 


-peritoneal fluid evaluated.  Negative for SBP 


-hold anticoagulation given thrombocytopenia 


-trial of albumin


-decrease lactulose


-decrease Lasix, continue spironolactone


-PPI, Carafate 


-repeat labs in a.m.


-potassium replacement





Critical care time spent with patient discussing and formulating plan of care: 

40 minutes.  This does not include time spent performing procedures.





This medical document was created using an electronic medical record system with

Dragon computerized dictation system.  Although this document has been carefully

reviewed, there may still be some phonetic and typographical errors.  These 

areas are purely typographical due to imperfections of the software programs, 

and do not reflect any compromise in the patient's medical care.


Plan discussed with:  Patient, Other (RN)


My Orders





                          Orders - YOANDY CASE NP








Procedure Category Date Status





  Time 


 


Basic Metabolic Panel LAB 3/3/25 Logged





  14:00 


 


Midodrine Tablet PHA 3/3/25 Verified





 (Proamatine Tablet)  12:00 


 


Albumin Ivpb PHA 3/3/25 Verified





  11:00 


 


Lactulose Oral PHA 3/4/25 Verified





  10:00 


 


Methylprednisolone PHA 3/4/25 Verified





Sod Succ (Solu Medrol  10:00 


 


Furosemide Injection PHA 3/4/25 Verified





 (Lasix Injection)  10:00 











Date of Service:  Mar 3, 2025


Billing Provider:  YOANDY CASE NP


Common Visit Codes:  25611-[id number] CARE 30-74 MIN











YOANDY CASE NP             Mar 3, 2025 11:07

## 2025-03-03 NOTE — DVH
Procedure: US ABDOMEN LIMITED  03/03/2025 08:58 AM



Indication: ASCITES



Comparison: US ABDOMEN LIMITED on DOS: 3/1/25, US ABDOMEN LIMITED on DOS: 2/12/25, US LIVER on DOS: 1
/22/25



Technique: Abdominal survey for fluid utilizing grayscale technique.



FINDINGS:



Small amount of ascites noted with the largest pocket in the right upper quadrant.



IMPRESSION:



1. Insufficient ascites for safe paracentesis. 



Electronically Signed by: Emilie Hanson at 03/03/2025 09:27:32 AM

## 2025-03-03 NOTE — DVHPN2
Progress Note


Date Seen:  Mar 3, 2025


Resident Creating Document:  JENNY MUELLER RESIDENT


Medical Necessity Reason


Pt with a Central, PICC or Fol:  No





Subjective


Review of Systems


Patient seen and examined at bedside 


On vasopressor levo


Tolerating diet


No nausea or vomiting


No diarrhea


No active GI bleed


Trending bilirubin and liver enzymes


Last ammonia less than 10





Objective


vital signs





                                   Vital Sign








  Date Time  Temp Pulse Resp B/P (MAP) Pulse Ox O2 Delivery O2 Flow Rate FiO2


 


3/3/25 12:00  63      


 


3/3/25 11:27   18 112/57 (75) 95   


 


3/3/25 06:00      Room Air* 0 21


 


3/3/25 04:00 97.5       





 97.5       














                           Total Intake and Output   


 


 3/2/25 3/2/25 3/3/25





 15:00 23:00 07:00


 


Intake Total 231.25 ml 163.75 ml 656.25 ml


 


Output Total 250 ml 216 ml 


 


Balance -18.75 ml -52.25 ml 656.25 ml








medications





                               Current Medications








 Medications  Dose


 Ordered  Sig/Kiran


 Route  Start Time


 Stop Time Status Last Admin


Dose Admin


 


 Norepinephrine


 Bitartrate  250 ml @ 


 3.75 mls/hr  Q24H


 IV  3/1/25 11:30


    3/2/25 12:00


18.75 MLS/HR


 


 Ondansetron HCl  4 mg  Q4HP  PRN


 IV  3/1/25 11:30


     





 


 Acetaminophen  650 mg  Q6HP  PRN


 PO  3/1/25 11:30


     





 


 Rifaximin  550 mg  BID


 PO  3/1/25 22:00


    3/3/25 11:46


550 MG


 


 Sucralfate  0.5 gm  QID


 PO  3/1/25 12:00


    3/3/25 12:11


0.5 GM


 


 Hydroxyzine


 Pamoate  25 mg  BID  PRN


 PO  3/1/25 13:00


     





 


 Patient Own


 Medication  1 tab  TID


 PO  3/1/25 14:00


     





 


 Piperacillin Sod/


 Tazobactam Sod  100 ml @ 


 25 mls/hr  Q8HR


 IV  3/1/25 14:00


    3/3/25 05:50


25 MLS/HR


 


 Spironolactone  25 mg  DAILY


 PO  3/2/25 10:00


    3/3/25 11:46


25 MG


 


 Pantoprazole


 Sodium  40 mg  DAILY@0600


 PO  3/2/25 06:00


    3/3/25 06:06


40 MG


 


 Midodrine  10 mg  TID@0600,1200,1800


 PO  3/3/25 12:00


    3/3/25 13:12


10 MG


 


 Albumin Human  50 ml @ 


 100 mls/hr  Q8H


 IV  3/3/25 11:00


 3/4/25 03:29  3/3/25 13:15


100 MLS/HR


 


 Lactulose  30 ml  DAILY


 PO  3/4/25 10:00


     





 


 Methylprednisolone


 Sodium Succinate  40 mg  DAILY


 IV  3/4/25 10:00


     





 


 Furosemide  20 mg  DAILY


 IV  3/4/25 10:00


     











Examination


General Appearance:  Cooperative. Well developed. Well nourished.  NAD


Head Exam:  Normal inspection


Neck Exam:  Normal inspection. Non-tender. Normal alignment


Pulmonary/Respiratory:  Chest non-tender. Clear bilateral breath sounds


Cardiovascular/Chest: Regular rate and rhythm.  No murmurs.  No JVD.


Peripheral Pulses:  2+ Radial (R). 2+ Radial (L). 2+ Pedal (R). 2+ Pedal (L)


Abdominal Exam:  Normal bowel sounds. Soft. Nontender.  No hepatospenomegaly.  

No masses


Ankle Exam: Negative ankle edema


Lower extremities: Negative lower extremity edema


Neuro/Mental Status: A&O x4.  Coherent


Thoughts/Psych:  Normal thought pattern.  Appropriate mood and affect.  Good 

judgement and insight


Appearance: In no acute distress


Skin Exam:  Normal inspection. Normal color. Warm. Dry


laboratory and microbiology


                                Laboratory Tests


3/3/25 03:47

















Test


 3/3/25


14:49 Range/Units


 


 


Serum Glucose Pending   








                                  Microbiology








 Date/Time


Source Procedure


Growth Status





 


 3/2/25 18:27


Peritoneal Fluid Gram Stain - Final


 Resulted


 


 3/2/25 18:27


Peritoneal Fluid Body Fluid Culture - Preliminary


 Resulted


 


 3/1/25 15:50


Voided Urine Urine Culture - Final


 Complete


 


 3/1/25 11:33


Blood Blood Culture - Preliminary


NO GROWTH AFTER 48 HOURS OF INCUBATION. Resulted











Problem List/Assessment/Plan


Problem List/Assessment/Plan


Autoimmune hepatitis


Cirrhosis of liver


Ascites


Transaminitis


Elevated bilirubin


Hypotension with lactic acidosis


ANA likely hemodynamically mediated





Plan/recommendation





Dr Moeller





-ultrasound abdomen insufficient ascites for paracentesis


-continue Levophed for hypotension, continue oral prednisolone 20 mg p.o. twice 

daily


-continue ursodiol 300 mg p.o. twice daily


-continue to monitor liver function


-tolerating diet, advanced as per toleration


-we will continue to monitor this patient


Plan discussed with:  Patient, Other (RN)





Dietary Evaluation Review


Comments:  


Encourage high fiber foods, consider Renal diet with 60g protein  


restriction is kidney function worsens.


Expected Outcomes/Goals:  


JENNY Pringle RESIDENT          Mar 3, 2025 15:10

## 2025-03-04 NOTE — DVHPN2
Progress Note - Dictate


Date Seen:  Mar 4, 2025


Medical Necessity Reason


Pt with a Central, PICC or Fol:  No


Subjective


Patient seen at bedside in ER bed 5


She is sleeping comfortably 


There was no active GI bleeding reported 


Her liver enzymes are elevated but stable


Patient is still on low-dose Levophed


vital signs





                                   Vital Sign








  Date Time  Temp Pulse Resp B/P (MAP) Pulse Ox O2 Delivery O2 Flow Rate FiO2


 


3/4/25 14:41    110/50    


 


3/4/25 12:00  61      


 


3/4/25 12:00   20  98 Room Air* 0 21


 


3/4/25 05:15 98.2       





 98.2       














                           Total Intake and Output   


 


 3/3/25 3/3/25 3/4/25





 15:00 23:00 07:00


 


Intake Total 770.02 ml 818.60 ml 167.5 ml


 


Balance 770.02 ml 818.60 ml 167.5 ml








medications





                               Current Medications








 Medications  Dose


 Ordered  Sig/Kiran


 Route  Start Time


 Stop Time Status Last Admin


Dose Admin


 


 Ondansetron HCl  4 mg  Q4HP  PRN


 IV  3/1/25 11:30


    3/4/25 12:06


4 MG


 


 Acetaminophen  650 mg  Q6HP  PRN


 PO  3/1/25 11:30


     





 


 Rifaximin  550 mg  BID


 PO  3/1/25 22:00


    3/4/25 12:05


550 MG


 


 Sucralfate  0.5 gm  QID


 PO  3/1/25 12:00


    3/4/25 12:05


0.5 GM


 


 Hydroxyzine


 Pamoate  25 mg  BID  PRN


 PO  3/1/25 13:00


     





 


 Patient Own


 Medication  1 tab  TID


 PO  3/1/25 14:00


     





 


 Piperacillin Sod/


 Tazobactam Sod  100 ml @ 


 25 mls/hr  Q8HR


 IV  3/1/25 14:00


    3/4/25 14:50


25 MLS/HR


 


 Spironolactone  25 mg  DAILY


 PO  3/2/25 10:00


    3/4/25 11:03


25 MG


 


 Pantoprazole


 Sodium  40 mg  DAILY@0600


 PO  3/2/25 06:00


    3/4/25 05:25


40 MG


 


 Midodrine  10 mg  TID@0600,1200,1800


 PO  3/3/25 12:00


    3/4/25 12:05


10 MG


 


 Lactulose  30 ml  DAILY


 PO  3/4/25 10:00


    3/4/25 10:48


30 ML


 


 Methylprednisolone


 Sodium Succinate  40 mg  DAILY


 IV  3/4/25 10:00


    3/4/25 11:03


40 MG


 


 Furosemide  20 mg  DAILY


 IV  3/4/25 10:00


    3/4/25 11:03


20 MG


 


 Nicardipine HCl  250 ml @ 


 50 mls/hr  Q5H


 IV  3/4/25 03:45


   Cancel  





 


 Azithromycin  250 ml @ 


 125 mls/hr  DAILY


 IV  3/4/25 10:30


    3/4/25 10:30


125 MLS/HR


 


 Norepinephrine


 Bitartrate  250 ml @ 


 0.938 mls/


 hr  Q24H


 IV  3/4/25 12:00


    3/4/25 12:00


7.5 MLS/HR








objective


Patient seen at bedside, awake and arousable but lethargic


Mild scleral icterus improved from before


Respiratory:  Normal air movement


Cardiovascular:  Normal S1, Normal S2, No murmurs


Abdomen is soft slightly distended but there was no significant ascites or 

tenderness at this time


Skin:  No significant lesion


Neuro:  Sensation intact


laboratory and microbiology


                                Laboratory Tests


3/4/25 04:52

















Test


 3/4/25


04:52 Range/Units


 


 


Serum Glucose 103    mg/dL








Problems(with codes):  


(1) Autoimmune hepatitis


(2) Ascites


(3) ANA (acute kidney injury)


(4) Transaminitis


(5) Hypotension


(6) Abdominal pain


(7) Cirrhosis of liver


(8) Lactic acidosis


(9) Jaundice


(10) Scleroderma


Prognosis


Plan





Continue supportive care 


Ursodiol 300 mg p.o. twice a day


Prednisone 20 mg p.o. twice a day


Correct coagulopathy 


Lactulose 30 mL p.o. daily 


Patient feels nauseous was lactulose 


Patient will be given Zofran prior to her dose of lactulose once a day


I will arrange outpatient elective abdominal paracentesis so we can prevent 

recurrent admissions 


In the meantime the patient's daughter is willing to continue to take care of 

her at home





Dietary Evaluation Review


Comments:  


Encourage high fiber foods, consider Renal diet with 60g protein  


restriction is kidney function worsens.


Expected Outcomes/Goals:  


regluar BMs


Plan discussed with:  Patient, Daughter (ER Nurse), Other











PAL MAN MD                 Mar 4, 2025 15:56

## 2025-03-04 NOTE — DVHPN2
Subjective


Patient denies any symptoms.  Reports that her abdominal pain has improved


Reviewed:  H&P


Changes from previous H/P or p:  No Changes


General:  Per HPI


Respiratory:  Shortness of breath


Gastrointestinal:  Abdominal Pain, Diarrhea





Objective


Vitals





Vital Signs








  Date Time  Temp Pulse Resp B/P (MAP) Pulse Ox O2 Delivery O2 Flow Rate FiO2


 


3/4/25 09:11    110/58    


 


3/4/25 08:00  65      


 


3/4/25 06:45   16  99   


 


3/4/25 06:00      Nasal Cannula* 2 28


 


3/4/25 05:15 98.2       





 98.2       








Intake/Output











                               Intake and Output 


 


 3/4/25





 07:00


 


Intake Total 1756.12 ml


 


Balance 1756.12 ml


 


 


 


Intake Oral 920 ml


 


IV Total 836.12 ml


 


# Voids 3


 


# Bowel Movements 3








General Appearance:  Alert, Oriented X3, Cooperative


HEENT:  Atraumatic, PERRLA


Lungs:  Clear to auscultation, Normal air movement


Cardiovascular:  Normal S1, Normal S2


Abdomen:  Normal bowel sounds, Soft, No hepatospenomegaly, Other (Tenderness 

noted to left lower:)


Musculoskeletal:  Normal sensory function, Normal motor function


Skin:  Dry, Intact


Psych/Mental Status:  Mental status NL, Mood NL


Medications





                               Current Medications








 Medications  Dose


 Ordered  Sig/Kiran


 Route  Start Time


 Stop Time Status Last Admin


Dose Admin


 


 Norepinephrine


 Bitartrate  250 ml @ 


 3.75 mls/hr  Q24H


 IV  3/1/25 11:30


    3/2/25 12:00


18.75 MLS/HR


 


 Ondansetron HCl  4 mg  Q4HP  PRN


 IV  3/1/25 11:30


     





 


 Acetaminophen  650 mg  Q6HP  PRN


 PO  3/1/25 11:30


     





 


 Rifaximin  550 mg  BID


 PO  3/1/25 22:00


    3/3/25 21:46


550 MG


 


 Sucralfate  0.5 gm  QID


 PO  3/1/25 12:00


    3/3/25 18:30


0.5 GM


 


 Hydroxyzine


 Pamoate  25 mg  BID  PRN


 PO  3/1/25 13:00


     





 


 Patient Own


 Medication  1 tab  TID


 PO  3/1/25 14:00


     





 


 Piperacillin Sod/


 Tazobactam Sod  100 ml @ 


 25 mls/hr  Q8HR


 IV  3/1/25 14:00


    3/4/25 05:16


25 MLS/HR


 


 Spironolactone  25 mg  DAILY


 PO  3/2/25 10:00


    3/3/25 11:46


25 MG


 


 Pantoprazole


 Sodium  40 mg  DAILY@0600


 PO  3/2/25 06:00


    3/4/25 05:25


40 MG


 


 Midodrine  10 mg  TID@0600,1200,1800


 PO  3/3/25 12:00


    3/4/25 05:25


10 MG


 


 Lactulose  30 ml  DAILY


 PO  3/4/25 10:00


     





 


 Methylprednisolone


 Sodium Succinate  40 mg  DAILY


 IV  3/4/25 10:00


     





 


 Furosemide  20 mg  DAILY


 IV  3/4/25 10:00


     





 


 Nicardipine HCl  250 ml @ 


 50 mls/hr  Q5H


 IV  3/4/25 03:45


   Cancel  














Laboratory Results


Laboratory Tests


3/4/25 04:52











Chemistry








Test


 3/3/25


14:49 3/4/25


04:52


 


Calcium Level


 9.6 mg/dL


(8.7-10.4) 10.2 mg/dL


(8.7-10.4)


 


Albumin


 


 4.2 g/dL


(3.2-4.8)


 


Total Protein


 


 6.3 g/dL


(5.7-8.2)








LFT








Test


 3/4/25


04:52


 


Alanine Aminotransferase (ALT)


 53 U/L (7-40)


H


 


Alkaline Phosphatase


 220 U/L


()  H


 


Aspartate Amino Transferase


(AST) 68 U/L (13-40)


H


 


Total Bilirubin


 4.0 mg/dL


(0.2-1.0)  H








Microbiology





                                  Microbiology








 Date/Time


Source Procedure


Growth Status





 


 3/2/25 18:27


Peritoneal Fluid Gram Stain - Final


 Resulted


 


 3/2/25 18:27


Peritoneal Fluid Body Fluid Culture - Preliminary


 Resulted


 


 3/1/25 15:50


Voided Urine Urine Culture - Final


 Complete


 


 3/1/25 11:33


Blood Blood Culture - Preliminary


NO GROWTH AFTER 48 HOURS OF INCUBATION. Resulted








Labs and/or images reviewed:  Labs reviewed by me, Image(s) reviewed by me





Assessment/Plan


Assessment/Plan


Impression:


-septic shock


-community-acquired pneumonia, probable Gram-positive/Gram-negative etiology.  


-cirrhosis of the liver with portal hypertension


-abdominal pain


-obesity 


-thrombocytopenia


-leukocytosis 


-rule out gastritis





Plan:


Events:  Continues to be on norepinephrine drip at 4 micrograms/minute.  Mild 

bump in creatinine.  Potassium repleted.  Thrombocytopenia improving.


-DVT prophylaxis


-continue midodrine 


-CVP reading


-continue antibiotic therapy with Zosyn , add azithromycin


-hold anticoagulation given thrombocytopenia 


-trial of albumin


-continue diuresis with Lasix, spironolactone


-PPI, Carafate 


-repeat labs in a.m.


-potassium replacement





Critical care time spent with patient discussing and formulating plan of care: 

40 minutes.  This does not include time spent performing procedures.





This medical document was created using an electronic medical record system with

Dragon computerized dictation system.  Although this document has been carefully

reviewed, there may still be some phonetic and typographical errors.  These 

areas are purely typographical due to imperfections of the software programs, 

and do not reflect any compromise in the patient's medical care.


Plan discussed with:  Patient, Other (RN)


My Orders





                          Orders - YOANDY CASE NP








Procedure Category Date Status





  Time 


 


Midodrine Tablet PHA 3/3/25 In Process





 (Proamatine Tablet)  12:00 


 


Lactulose Oral PHA 3/4/25 In Process





  10:00 


 


Methylprednisolone PHA 3/4/25 In Process





Sod Succ (Solu Medrol  10:00 


 


Furosemide Injection PHA 3/4/25 In Process





 (Lasix Injection)  10:00 


 


Initiate Vte MOHINI 3/3/25 In Process





Prophylaxis  15:41 


 


Azithromycin  500mg/ PHA 3/4/25 Verified





250ml (Zithromax 50  10:30 


 


Potassium Er Tablet PHA 3/4/25 Verified





 (Klor-Con Tablet)  10:30 


 


Basic Metabolic Panel LAB 3/5/25 Verified





  04:00 


 


Complete Blood Count LAB 3/5/25 Verified





  04:00 











Date of Service:  Mar 4, 2025


Billing Provider:  YOANDY CASE NP


Common Visit Codes:  [id number] CARE 30-74 MIN











YOANDY CASE NP             Mar 4, 2025 10:34

## 2025-03-05 NOTE — DVHPN2
Progress Note - Dictate


Date Seen:  Mar 5, 2025


Medical Necessity Reason


Pt with a Central, PICC or Fol:  No


Subjective


Patient underwent paracentesis at bedside


2.5 L of fluid was removed


Patient is still on low-dose Levophed


vital signs





                                   Vital Sign








  Date Time  Temp Pulse Resp B/P (MAP) Pulse Ox O2 Delivery O2 Flow Rate FiO2


 


3/5/25 18:15  59 16 119/53 (75) 94   


 


3/5/25 18:00      Nasal Cannula* 1 24


 


3/5/25 16:00 98.0       





 98.0       














                           Total Intake and Output   


 


 3/4/25 3/4/25 3/5/25





 15:00 23:00 07:00


 


Intake Total 491.876 ml 575.626 ml 116.875 ml


 


Output Total  500 ml 400 ml


 


Balance 491.876 ml 75.626 ml -283.125 ml








medications





                               Current Medications








 Medications  Dose


 Ordered  Sig/Kiran


 Route  Start Time


 Stop Time Status Last Admin


Dose Admin


 


 Ondansetron HCl  4 mg  Q4HP  PRN


 IV  3/1/25 11:30


    3/4/25 12:06


4 MG


 


 Acetaminophen  650 mg  Q6HP  PRN


 PO  3/1/25 11:30


     





 


 Rifaximin  550 mg  BID


 PO  3/1/25 22:00


    3/5/25 09:35


550 MG


 


 Sucralfate  0.5 gm  QID


 PO  3/1/25 12:00


    3/5/25 17:30


0.5 GM


 


 Hydroxyzine


 Pamoate  25 mg  BID  PRN


 PO  3/1/25 13:00


     





 


 Patient Own


 Medication  1 tab  TID


 PO  3/1/25 14:00


     





 


 Piperacillin Sod/


 Tazobactam Sod  100 ml @ 


 25 mls/hr  Q8HR


 IV  3/1/25 14:00


    3/5/25 14:09


25 MLS/HR


 


 Spironolactone  25 mg  DAILY


 PO  3/2/25 10:00


    3/5/25 09:35


25 MG


 


 Pantoprazole


 Sodium  40 mg  DAILY@0600


 PO  3/2/25 06:00


    3/5/25 07:33


40 MG


 


 Midodrine  10 mg  TID@0600,1200,1800


 PO  3/3/25 12:00


    3/5/25 17:30


10 MG


 


 Lactulose  30 ml  DAILY


 PO  3/4/25 10:00


    3/4/25 10:48


30 ML


 


 Methylprednisolone


 Sodium Succinate  40 mg  DAILY


 IV  3/4/25 10:00


    3/5/25 09:33


40 MG


 


 Furosemide  20 mg  DAILY


 IV  3/4/25 10:00


    3/5/25 09:34


20 MG


 


 Nicardipine HCl  250 ml @ 


 50 mls/hr  Q5H


 IV  3/4/25 03:45


   Cancel  





 


 Azithromycin  250 ml @ 


 125 mls/hr  DAILY


 IV  3/4/25 10:30


    3/5/25 09:32


125 MLS/HR


 


 Norepinephrine


 Bitartrate  250 ml @ 


 0.938 mls/


 hr  Q24H


 IV  3/4/25 12:00


    3/4/25 12:00


7.5 MLS/HR


 


 Potassium Chloride  10 meq  DAILY


 PO  3/6/25 10:00


     











objective


Patient seen at bedside, awake and arousable but lethargic


Mild scleral icterus improved from before


Respiratory:  Normal air movement


Cardiovascular:  Normal S1, Normal S2, No murmurs


Abdomen is soft slightly distended but there was no significant ascites or 

tenderness at this time


Skin:  No significant lesion


Neuro:  Sensation intact


laboratory and microbiology


                                Laboratory Tests


3/5/25 05:00

















Test


 3/5/25


05:00 Range/Units


 


 


Serum Glucose 135 H   mg/dL








Problems(with codes):  


(1) Jaundice


(2) Scleroderma


(3) Autoimmune hepatitis


(4) Ascites


(5) ANA (acute kidney injury)


(6) Transaminitis


(7) Hypotension


(8) Abdominal pain


(9) Cirrhosis of liver


Prognosis


Plan





Continue to monitor liver enzymes 


Taper down steroids once liver enzymes improve


Patient is undergoing  wound care


This patient prognosis is guarded and she is progressively debilitated


Discuss with family about long-term goals 


I will try to arrange outpatient elective paracentesis on a weekly basis so she 

does not have to be hospitalized





Dietary Evaluation Review


Comments:  


Encourage high fiber foods, consider Renal diet with 60g protein  


restriction is kidney function worsens.


Expected Outcomes/Goals:  


regluar BMs


Plan discussed with:  Patient, Daughter











PAL MAN MD                 Mar 5, 2025 20:56

## 2025-03-05 NOTE — DVH
ULTRASOUND ABDOMEN limited, 4 QUADRANTS



INDICATION: FLUID CHECK FOR POSSIBLE PARACENTESIS



Evaluate for ascites.



TECHNIQUE: 



The four quadrants of the abdomen were scanned in grey-scale to assess for the presence of ascites. N
o solid organ assessment was performed.



FINDINGS/IMPRESSIONS: 



Small volume ascites.



Electronically Signed by: Princess Daniel at 03/05/2025 08:02:55 AM

## 2025-03-05 NOTE — DVHPN2
Subjective


Patient denies any symptoms.  Reports that her abdominal pain has improved


Reviewed:  H&P


Changes from previous H/P or p:  No Changes


General:  Per HPI


Respiratory:  Shortness of breath


Gastrointestinal:  Abdominal Pain, Diarrhea





Objective


Vitals





Vital Signs








  Date Time  Temp Pulse Resp B/P (MAP) Pulse Ox O2 Delivery O2 Flow Rate FiO2


 


3/5/25 08:45  62 16 122/61 (81) 95   


 


3/5/25 08:00      Nasal Cannula* 1 24


 


3/5/25 08:00 98.5       





 98.5       








Intake/Output











                               Intake and Output 


 


 3/5/25





 07:00


 


Intake Total 1184.377 ml


 


Output Total 900 ml


 


Balance 284.377 ml


 


 


 


Intake Oral 600 ml


 


IV Total 584.377 ml


 


Output Urine Total 900 ml


 


# Bowel Movements 3








General Appearance:  Alert, Oriented X3, Cooperative


HEENT:  Atraumatic, PERRLA


Lungs:  Clear to auscultation, Normal air movement


Cardiovascular:  Normal S1, Normal S2


Abdomen:  Normal bowel sounds, Soft, No hepatospenomegaly, Other (Tenderness 

noted to left lower:)


Musculoskeletal:  Normal sensory function, Normal motor function


Skin:  Dry, Intact


Psych/Mental Status:  Mental status NL, Mood NL


Medications





                               Current Medications








 Medications  Dose


 Ordered  Sig/Kiran


 Route  Start Time


 Stop Time Status Last Admin


Dose Admin


 


 Ondansetron HCl  4 mg  Q4HP  PRN


 IV  3/1/25 11:30


    3/4/25 12:06


4 MG


 


 Acetaminophen  650 mg  Q6HP  PRN


 PO  3/1/25 11:30


     





 


 Rifaximin  550 mg  BID


 PO  3/1/25 22:00


    3/4/25 21:29


550 MG


 


 Sucralfate  0.5 gm  QID


 PO  3/1/25 12:00


    3/5/25 07:33


0.5 GM


 


 Hydroxyzine


 Pamoate  25 mg  BID  PRN


 PO  3/1/25 13:00


     





 


 Patient Own


 Medication  1 tab  TID


 PO  3/1/25 14:00


     





 


 Piperacillin Sod/


 Tazobactam Sod  100 ml @ 


 25 mls/hr  Q8HR


 IV  3/1/25 14:00


    3/5/25 07:34


25 MLS/HR


 


 Spironolactone  25 mg  DAILY


 PO  3/2/25 10:00


    3/4/25 11:03


25 MG


 


 Pantoprazole


 Sodium  40 mg  DAILY@0600


 PO  3/2/25 06:00


    3/5/25 07:33


40 MG


 


 Midodrine  10 mg  TID@0600,1200,1800


 PO  3/3/25 12:00


    3/5/25 07:33


10 MG


 


 Lactulose  30 ml  DAILY


 PO  3/4/25 10:00


    3/4/25 10:48


30 ML


 


 Methylprednisolone


 Sodium Succinate  40 mg  DAILY


 IV  3/4/25 10:00


    3/4/25 11:03


40 MG


 


 Furosemide  20 mg  DAILY


 IV  3/4/25 10:00


    3/4/25 11:03


20 MG


 


 Nicardipine HCl  250 ml @ 


 50 mls/hr  Q5H


 IV  3/4/25 03:45


   Cancel  





 


 Azithromycin  250 ml @ 


 125 mls/hr  DAILY


 IV  3/4/25 10:30


    3/4/25 10:30


125 MLS/HR


 


 Norepinephrine


 Bitartrate  250 ml @ 


 0.938 mls/


 hr  Q24H


 IV  3/4/25 12:00


    3/4/25 12:00


7.5 MLS/HR











Laboratory Results


Laboratory Tests


3/5/25 05:00











Chemistry








Test


 3/5/25


05:00


 


Calcium Level


 9.6 mg/dL


(8.7-10.4)








Microbiology





                                  Microbiology








 Date/Time


Source Procedure


Growth Status





 


 3/2/25 18:27


Peritoneal Fluid Gram Stain - Final


 Resulted


 


 3/2/25 18:27


Peritoneal Fluid Body Fluid Culture - Preliminary


 Resulted


 


 3/1/25 15:50


Voided Urine Urine Culture - Final


 Complete


 


 3/1/25 11:33


Blood Blood Culture - Preliminary


NO GROWTH AFTER 72 HOURS OF INCUBATION. Resulted








Labs and/or images reviewed:  Labs reviewed by me, Image(s) reviewed by me





Assessment/Plan


Assessment/Plan


Impression:


-septic shock


-community-acquired pneumonia, probable Gram-positive/Gram-negative etiology.  


-cirrhosis of the liver with portal hypertension


-abdominal pain


-obesity 


-thrombocytopenia


-leukocytosis 


-rule out gastritis





Plan:


Events:  Norepinephrine drip at 1 microgram/minute.  Patient did have 

paracentesis with 2.5 L removed.  Patient clinically states she feels better.


-DVT prophylaxis


-continue midodrine 


-CVP reading


-continue antibiotic therapy with Zosyn , add azithromycin


-hold anticoagulation given thrombocytopenia 


-trial of albumin


-continue diuresis with Lasix, spironolactone


-PPI, Carafate 


-repeat labs in a.m.


-potassium replacement


-physical therapy evaluate





Critical care time spent with patient discussing and formulating plan of care: 

40 minutes.  This does not include time spent performing procedures.





This medical document was created using an electronic medical record system with

Dragon computerized dictation system.  Although this document has been carefully

reviewed, there may still be some phonetic and typographical errors.  These 

areas are purely typographical due to imperfections of the software programs, 

and do not reflect any compromise in the patient's medical care.


Plan discussed with:  Patient, Other (RN)


My Orders





                          Orders - YOANDY CASE NP








Procedure Category Date Status





  Time 


 


Azithromycin  500mg/ PHA 3/4/25 In Process





250ml (Zithromax 50  10:30 


 


* Wound Consult CONS 3/4/25 Transmitted





   


 


Norepinephrine 8 PHA 3/4/25 In Process





Mg/250ml Kit  12:00 


 


Urine Bacterial EMEKA 3/4/25 Uncollected





Culture  15:39 


 


Paracentesis US 3/5/25 Resulted





  07:55 


 


Pt Request For Service PT 3/5/25 Verified





  09:12 











Date of Service:  Mar 5, 2025


Billing Provider:  YOANDY CASE NP


Common Visit Codes:  [id number] CARE 30-74 MIN











YOANDY CASE NP             Mar 5, 2025 09:17

## 2025-03-05 NOTE — DVH
US PARACENTESIS, 



HISTORY: ASCITES



PROCEDURE: Informed consent was obtained. The patient was placed in supine position. A limited locali
zation ultrasound of the abdomen was obtained, and the skin site over the largest pocket of fluid was
 marked and entry site was prepped with chlorhexidine which was allowed to dry and draped in the usua
l sterile fashion. Time out was performed. Following administration of 1% lidocaine local anesthetic,
 a 5 Polish centesis needle catheter was percutaneously inserted into the peritoneal collection until
 fluid was aspirated. The catheter was advanced into the fluid collection and the needle removed. Abo
ut 2500 cc of fluid was aspirated and specimen sent for appropriate cultures/cytology/cultures and cy
tology. The catheter was then removed and a sterile dressing applied.  No immediate complication was 
identified.



FINDINGS: Limited ultrasound imaging demonstrates mild ascites. Aspirated fluid was clear and serous.




IMPRESSION: 



US-guided paracentesis with 2.5L removed.



Electronically Signed by: Wilver Blanco at 03/05/2025 08:58:27 AM

## 2025-03-06 NOTE — DVHINCON2
Date Seen:  Mar 6, 2025


Referring Physician


NUVIA Vila





Reason for Consultation


Bradycardia





History of Present Illness


This is a 68-year-old female patient who presents to the emergency room with 

chief complaint of abdominal pain, diarrhea, and generalized weakness.  The 

patient reports that she was recently seen and discharged from [facility].  Her daughter who was at bedside confirms that the 

patient has had multiple admissions to this facility as well as other facilities

in the area.  Cardiology is now being consulted for bradycardia. A twelve lead 

electrocardiogram was done today by bedside RN and reveals sinus bradycardia 

nonspecific ST segment depression to anterolateral leads.  The patient denies 

any cardiac symptoms such as chest pain or shortness of breath.  Significant 

past medical history includes nonalcoholic liver cirrhosis, chronic anemia, 

scleroderma, and chronic kidney disease.





Past Medical History


Past medical history reviewed. No other significant than mentioned above.





Past Surgical History


Esophageal varices with banding





Family History:  


FH: breast cancer


  G8 MOTHER


Scleroderma


  G8 FATHER, 


Family History


Family history reviewed.


Social History


Denies the use of tobacco, alcohol or illicit drugs.





Allergies:  


Coded Allergies:  


     NO KNOWN ALLERGIES (Unverified , 8/10/21)


Home Meds


Active Scripts


Prednisone (Prednisone) 20 Mg Tab, 20 MG PO DAILY for 30 Days, #30 MG


   Prov:HAMILTON LAMAS RESIDENT         24


Midodrine HCl (Midodrine Hydrochloride) 10 Mg Tab, 10 MG PO TID for 30 Days, #90

TAB 3 Refills


   Prov:HAMILTON LAMAS RESIDENT         24


Pantoprazole Sodium Sesquihydr (Protonix) 40 Mg Tab, 40 MG PO DAILY for 30 Days,

#30 TAB


   Prov:HAMILTON LAMAS RESIDENT         24


Reported Medications


Furosemide (Furosemide) 20 Mg Tab, 1 TAB PO TID for 30 Days, #90


   25


Sucralfate (CARAFATE SUSP) 1 Gm/10 Ml Ss, 5 ML PO QID


   25


Hydroxyzine Hcl (Hydroxyzine Hcl) 25 Mg Tab, 2 TAB PO BID PRN for FOR ITCHING


   25


Propranolol HCl (Propranolol Hydrochloride) 10 Mg Tab, 1 TAB PO TID


   25


Rifaximin (Xifaxan) 550 Mg Tab, 1 TAB PO BID


   25


Spironolactone (Spironolactone) 25 Mg Tab, 2 TAB PO BID


   25


Ursodiol (Ursodiol) 500 Mg Tab, 1 TAB PO TID


   25


Home Meds


Home medications reviewed.


Current Medications





                               Current Medications








 Medications


  (Trade)  Dose


 Ordered  Sig/Kiran


 Route


 PRN Reason  Start Time


 Stop Time Status Last Admin


 


 Potassium Chloride


  (Klor-Con Tablet)  10 meq  DAILY


 PO


   3/6/25 10:00


    3/6/25 09:21





 


 Potassium Chloride  100 ml @ 


 50 mls/hr  Q2H


 IV


   3/6/25 10:15


 3/6/25 14:14 DC 3/6/25 15:42





 


 Ceftriaxone Sodium  50 ml @ 


 100 mls/hr  DAILY@09


 IV


   3/7/25 09:00


     














Review of Systems


Constitutional:  No symptom reported


Ears, Nose, & Throat:  No symptom reported


Eyes:  No symptom reported


Neurological: No symptoms reported


Pulmonary/Respiratory: No symptoms reported


Cardiovascular: No symptom reported


Gastrointestinal:  Abdominal pain and distention


Genitourinary:  No symptom reported


Musculoskeletal:  No symptom reported


Skin:  No symptom reported


Psychiatric:  No symptom reported


Endocrine:  No symptom reported


Hematologic/Lymphatic:  No symptom reported





Vital Signs





                                   Vital Signs








  Date Time  Temp Pulse Resp B/P (MAP) Pulse Ox O2 Delivery O2 Flow Rate FiO2


 


3/6/25 16:15  49 24 114/52 (72) 97   


 


3/6/25 16:00 97.8       





 97.8       


 


3/6/25 14:00      Nasal Cannula* 1 24








Physical Exam


General Appearance: Cooperative. Well-developed. Well-nourished. No acute 

distress. 


Pulmonary/Respiratory:  Diminished bilateral lower lobes


Cardiovascular/Chest: Regular rate and rhythm.  


Peripheral Pulses:  2+ Radial (R). 2+ Radial (L). 2+ Pedal (R). 2+ Pedal (L)


Abdominal Exam:  Normal bowel sounds. 


Ankle Exam: Negative ankle edema


Lower extremities: Negative lower extremity edema


Neuro/Mental Status: A/OX4, coherent. 


Thoughts/Psych: Normal thought pattern. Appropriate mood and affect. Good 

judgment and insight. 


Appearance: No acute distress. 


Skin Exam:  Normal inspection.  Normal color. Warm and dry.





Labs/Diagnostic Data





                                      Labs








Test


 3/6/25


04:57 3/5/25


22:15 3/5/25


09:45 3/5/25


05:00 Range/Units


 


 


White Blood Count


 7.4 #


 


 


 


 4.4-10.8


10^3/uL


 


Red Blood Count


 3.83 L


 


 


 


 4.0-5.20


10^6/uL


 


Hemoglobin 11.4 L    12.2-16.2  g/dL


 


Hematocrit 34.4 L    36.0-46.0  %


 


Mean Corpuscular Volume 89.9     80.0-100.0  fL


 


Mean Corpuscular Hemoglobin 29.9     28.0-32.0  pg


 


Mean Corpuscular Hemoglobin


Concent 33.3 


 


 


 


 32.0-36.0  g/dL





 


Red Cell Distribution Width 20.8 H    11.8-14.3  %


 


Platelet Count


 75 L


 


 


 


 140-450


10^3/uL


 


Mean Platelet Volume 8.1     6.9-10.8  fL


 


Neutrophils (%) (Auto) 90.7 H    37.0-80.0  %


 


Lymphocytes (%) (Auto) 5.5 L    10.0-50.0  %


 


Monocytes (%) (Auto) 3.7     0.0-12.0  %


 


Eosinophils (%) (Auto) 0.0     0.0-7.0  %


 


Basophils (%) (Auto) 0.1     0.0-2.0  %


 


Neutrophils # (Auto)


 6.7 


 


 


 


 1.6-8.6  10


^3/uL


 


Lymphocytes # (Auto)


 0.4 


 


 


 


 0.4-5.4  10


^3/uL


 


Monocytes # (Auto) 0.3     0-1.3  10 ^3/uL


 


Eosinophils # (Auto) 0     0-0.8  10 ^3/uL


 


Basophils # (Auto) 0     0-0.2  10 ^3/uL


 


Nucleated Red Blood Cells 0.0      %


 


Sodium Level 135 L    136-145  mmol/L


 


Potassium Level 3.0 L    3.5-5.1  mmol/L


 


Chloride Level 98       mmol/L


 


Carbon Dioxide Level 24     20-31  mmol/L


 


Anion Gap 13     5-15  


 


Blood Urea Nitrogen 30 H    9-23  mg/dL


 


Creatinine


 1.71 H


 


 


 


 0.550-1.02


mg/dL


 


Glomerular Filtration Rate


Calc 32 


 


 


 


 >90  mL/min





 


BUN/Creatinine Ratio 17.5     10.0-20.0  


 


Serum Glucose 121 H      mg/dL


 


Calcium Level 10.0     8.7-10.4  mg/dL


 


Magnesium Level 2.0     1.6-2.6  mg/dL


 


Total Bilirubin 2.3 H    0.2-1.0  mg/dL


 


Aspartate Amino Transferase


(AST) 53 H


 


 


 


 13-40  U/L





 


Alanine Aminotransferase (ALT) 45 H    7-40  U/L


 


Alkaline Phosphatase 222 H      U/L


 


Total Protein 5.6 L    5.7-8.2  g/dL


 


Albumin 3.4     3.2-4.8  g/dL


 


Thyroid Stimulating Hormone


(TSH) 0.26 L


 


 


 


 0.55-4.78


uIU/mL


 


Urine Color  Light-yellow    Yellow  


 


Urine Clarity  Clear    Clear  


 


Urine pH  5.0    5.0-9.0  


 


Urine Specific Gravity  1.013    1.001-1.035  


 


Urine Protein  Negative    Negative  


 


Urine Ketones  Trace    Negative  


 


Urine Blood  Negative    Negative  /uL


 


Urine Nitrite  Negative    Negative  


 


Urine Bilirubin  Negative    Negative  


 


Urine Urobilinogen  Normal    Negative  mg/dL


 


Urine Leukocyte Esterase  Negative    Negative  /uL


 


Urine RBC  None seen    0 - 4  /hpf


 


Urine Microscopic WBC      0-5  /HPF


 


Urine Squamous Epithelial


Cells 


 Few 


 


 


 <5  /hpf





 


Urine Bacteria  Few H   None Seen  /hpf


 


Urine Mucus  Few    None Seen  


 


Urine Glucose  Normal    Normal  mg/dL


 


Body Fluid Source   Ascities fluid    


 


Body Fluid pH   8.0    


 


Body Fluid WBC (Manual)   310 H  0-200  CUMM


 


Body Fluid RBC (Manual)   10   0-2000  CUMM


 


Body Fluid Mononuclear Cells   84    %


 


Body Fluid Polymorphonuclear


Cells 


 


 16 


 


 0-25  %





 


Body Fluid Glucose   157   .  mg/dL


 


Body Fluid Total Protein   0.5   .  g/dL


 


Body Fluid Lactate


Dehydrogenase 


 


 50 


 


 .  IU/L





 


Platelet Estimate    Decreased   


 


Test


 3/2/25


18:27 3/2/25


07:53 3/1/25


12:05 3/1/25


11:33 Range/Units


 


 


Body Fluid Albumin


 <0.2 


 


 


 


 Not Estab.


g/dL


 


POC Glucose  100      mg/dl


 


Lactic Acid Level   2.1 *H  0.4-2.0  mmol/L


 


Ammonia   < 10 L  11-32  umol/L


 


Prothrombin Time    11.9 H 9.3-11.8  sec


 


Prothrombin Time INR    1.14  0.9-1.15  


 


Activated Partial


Thromboplast Time 


 


 


 26.2 


 24.5-34.5  SEC





 


Amylase Level    97    U/L


 


Lipase    58 H 12-53  U/L








                                  Microbiology








 Date/Time


Source Procedure


Growth Status





 


 3/5/25 09:45


Ascities Fluid Gram Stain - Final


 Resulted


 


 3/5/25 09:45


Ascities Fluid Body Fluid Culture - Preliminary


 Resulted


 


 3/5/25 06:55


Nose MRSA Screen - Final


 Complete


 


 3/4/25 21:41


Urine - Catheterized Urine Culture - Preliminary


 Resulted


 


 3/1/25 11:33


Blood Blood Culture - Final


NO GROWTH AFTER 5 DAYS OF INCUBATION. Complete











Assessment


Sinus bradycardia


Liver cirrhosis 


Hypokalemia


Thrombocytopenia


Transaminitis


Acute kidney injury vs CKD


Pneumonia


Scleroderma


Plan/Recommendation


We will continue with following plan/recommendations (Dr. Lundy):





Patient seen and examined at bedside with . Transthoracic echocardiogram

from 2025 reveals EF 65-70%.  The patient now presents with intermittent 

sinus bradycardia.  No pauses or high-degree blocks noted after reviewing 

cardiac monitor.  At this time, we will recommend to avoid all AV suzanne blocking

agents.  It was worth noting that the patient takes midodrine on schedule at [facility] and has been restarted on this medication here.  Side effects of midodrine 

include bradycardia.  We would recommend to discontinue midodrine if patient is 

able to tolerate.  Continue vasopressor therapy in the meantime.  The patient is

not a candidate for any temporary or permanent pacemaker insertion at this time.

 Continue with close cardiac surveillance.  Monitor ECG and notify cardiology 

team immediately for any pauses or AV blocks.  Thank you for allowing us to care

for this patient. Please call with any questions or concerns.  Critical care 

time spent: 42 minutes





This medical document was created using an electronic medical record system with

voice recognition software and computerized dictation system.  Although this 

document has been carefully reviewed, there might still be some phonetic and 

typographical errors.  Occasional wrong-word or ``sound-alike substitutions 

may have occurred due to the inherent limitations of voice recognition software.

 These areas are purely typographical due to imperfections of the software 

programs and do not reflect any compromise in the patient's medical care.  

Please read the chart carefully and recognize, using context, where these 

substitutions have occurred.


Plan discussed with:  Patient


NYHA








Physical activity limitations: NA











Date of Service:  Mar 6, 2025


Billing Provider:  CATERINA ESTRELLA


Cardiology Common Codes:  99223-INITIAL  INP/OBS CARE (High)


Cardiology Consultation Codes:  72301-OVXUTRCJC CONSULT <45MIN











CATERINA ESTRELLA              Mar 6, 2025 16:37

## 2025-03-06 NOTE — DVHPN2
Subjective


Patient denies any symptoms.  Reports that her abdominal pain has improved


Reviewed:  H&P


Changes from previous H/P or p:  No Changes


General:  Per HPI


Respiratory:  Shortness of breath


Gastrointestinal:  Abdominal Pain, Diarrhea





Objective


Vitals





Vital Signs








  Date Time  Temp Pulse Resp B/P (MAP) Pulse Ox O2 Delivery O2 Flow Rate FiO2


 


3/6/25 08:00    130/70    


 


3/6/25 08:00 99.4 47 17  98   





 99.4       


 


3/6/25 06:00      Nasal Cannula* 1 24








Intake/Output











                               Intake and Output 


 


 3/6/25





 07:00


 


Intake Total 2325.003 ml


 


Output Total 925 ml


 


Balance 1400.003 ml


 


 


 


Intake Oral 1720 ml


 


IV Total 605.003 ml


 


Output Urine Total 925 ml


 


# Bowel Movements 1








General Appearance:  Alert, Oriented X3, Cooperative


HEENT:  Atraumatic, PERRLA


Lungs:  Clear to auscultation, Normal air movement


Cardiovascular:  Normal S1, Normal S2


Abdomen:  Normal bowel sounds, Soft, No hepatospenomegaly, Other (Tenderness 

noted to left lower:)


Musculoskeletal:  Normal sensory function, Normal motor function


Skin:  Dry, Intact


Psych/Mental Status:  Mental status NL, Mood NL


Medications





                               Current Medications








 Medications  Dose


 Ordered  Sig/Kiran


 Route  Start Time


 Stop Time Status Last Admin


Dose Admin


 


 Ondansetron HCl  4 mg  Q4HP  PRN


 IV  3/1/25 11:30


    3/4/25 12:06


4 MG


 


 Acetaminophen  650 mg  Q6HP  PRN


 PO  3/1/25 11:30


     





 


 Rifaximin  550 mg  BID


 PO  3/1/25 22:00


    3/6/25 09:22


550 MG


 


 Sucralfate  0.5 gm  QID


 PO  3/1/25 12:00


    3/6/25 06:20


0.5 GM


 


 Hydroxyzine


 Pamoate  25 mg  BID  PRN


 PO  3/1/25 13:00


     





 


 Patient Own


 Medication  1 tab  TID


 PO  3/1/25 14:00


     





 


 Piperacillin Sod/


 Tazobactam Sod  100 ml @ 


 25 mls/hr  Q8HR


 IV  3/1/25 14:00


    3/6/25 06:20


25 MLS/HR


 


 Spironolactone  25 mg  DAILY


 PO  3/2/25 10:00


    3/6/25 09:22


25 MG


 


 Pantoprazole


 Sodium  40 mg  DAILY@0600


 PO  3/2/25 06:00


    3/6/25 06:20


40 MG


 


 Midodrine  10 mg  TID@0600,1200,1800


 PO  3/3/25 12:00


    3/6/25 06:20


10 MG


 


 Lactulose  30 ml  DAILY


 PO  3/4/25 10:00


    3/4/25 10:48


30 ML


 


 Methylprednisolone


 Sodium Succinate  40 mg  DAILY


 IV  3/4/25 10:00


    3/6/25 09:23


40 MG


 


 Furosemide  20 mg  DAILY


 IV  3/4/25 10:00


    3/5/25 09:34


20 MG


 


 Nicardipine HCl  250 ml @ 


 50 mls/hr  Q5H


 IV  3/4/25 03:45


   Cancel  





 


 Azithromycin  250 ml @ 


 125 mls/hr  DAILY


 IV  3/4/25 10:30


    3/5/25 09:32


125 MLS/HR


 


 Norepinephrine


 Bitartrate  250 ml @ 


 0.938 mls/


 hr  Q24H


 IV  3/4/25 12:00


    3/4/25 12:00


7.5 MLS/HR


 


 Potassium Chloride  10 meq  DAILY


 PO  3/6/25 10:00


    3/6/25 09:21


10 MEQ











Laboratory Results


Laboratory Tests


3/6/25 04:57











Chemistry








Test


 3/6/25


04:57


 


Albumin


 3.4 g/dL


(3.2-4.8)


 


Calcium Level


 10.0 mg/dL


(8.7-10.4)


 


Total Protein


 5.6 g/dL


(5.7-8.2)  L








LFT








Test


 3/6/25


04:57


 


Alanine Aminotransferase (ALT)


 45 U/L (7-40)


H


 


Alkaline Phosphatase


 222 U/L


()  H


 


Aspartate Amino Transferase


(AST) 53 U/L (13-40)


H


 


Total Bilirubin


 2.3 mg/dL


(0.2-1.0)  H








Urinalysis








Test


 3/5/25


22:15


 


Urine Color


 Light-yellow


(Yellow)


 


Urine Clarity Clear (Clear)  


 


Urine pH 5.0 (5.0-9.0)  


 


Urine Specific Gravity


 1.013


(1.001-1.035)


 


Urine Protein


 Negative


(Negative)


 


Urine Ketones


 Trace


(Negative)


 


Urine Blood


 Negative /uL


(Negative)


 


Urine Nitrite


 Negative


(Negative)


 


Urine Bilirubin


 Negative


(Negative)


 


Urine Urobilinogen


 Normal mg/dL


(Negative)


 


Urine Leukocyte Esterase


 Negative /uL


(Negative)


 


Urine RBC


 None seen /hpf


(0 - 4)


 


Urine Microscopic WBC  /HPF (0-5)  


 


Urine Squamous Epithelial


Cells Few /hpf (<5)  





 


Urine Bacteria


 Few /hpf (None


Seen)  H


 


Urine Mucus


 Few (None


Seen)


 


Urine Glucose


 Normal mg/dL


(Normal)








Microbiology





                                  Microbiology








 Date/Time


Source Procedure


Growth Status





 


 3/5/25 06:55


Nose MRSA Screen - Final


 Complete





 3/2/25 18:27


Peritoneal Fluid Gram Stain - Final


 Resulted


 


 3/2/25 18:27


Peritoneal Fluid Body Fluid Culture - Preliminary


 Resulted


 


 3/1/25 15:50


Voided Urine Urine Culture - Final


 Complete


 


 3/1/25 11:33


Blood Blood Culture - Preliminary


NO GROWTH AFTER 72 HOURS OF INCUBATION. Resulted








Labs and/or images reviewed:  Labs reviewed by me, Image(s) reviewed by me





Assessment/Plan


Assessment/Plan


Impression:


-septic shock


-community-acquired pneumonia, probable Gram-positive/Gram-negative etiology.  


-cirrhosis of the liver with portal hypertension


-abdominal pain


-obesity 


-thrombocytopenia


-leukocytosis 


-rule out gastritis





Plan:


Events:  Epinephrine at 0.5 mcg per minute.


-DVT prophylaxis


-continue midodrine 


-CVP reading


-continue antibiotic therapy with Zosyn , add azithromycin


-hold anticoagulation given thrombocytopenia 


-continue diuresis with Lasix, spironolactone


-PPI, Carafate 


-repeat labs in a.m.


-potassium replacement


-physical therapy evaluation





Critical care time spent with patient discussing and formulating plan of care: 

40 minutes.  This does not include time spent performing procedures.





This medical document was created using an electronic medical record system with

Dragon computerized dictation system.  Although this document has been carefully

reviewed, there may still be some phonetic and typographical errors.  These 

areas are purely typographical due to imperfections of the software programs, 

and do not reflect any compromise in the patient's medical care.


Plan discussed with:  Patient, Other (RN)


My Orders





                          Orders - YOANDY CASE NP








Procedure Category Date Status





  Time 


 


Urine Bacterial EMEKA 3/4/25 In Process





Culture  15:39 


 


Cleanse Wound With MOHINI 3/5/25 In Process





Mild Soap A  15:04 











Date of Service:  Mar 6, 2025


Billing Provider:  YOANDY CASE NP


Common Visit Codes:  68556-[id number] CARE 30-74 MIN











YOANDY CASE NP             Mar 6, 2025 10:11

## 2025-03-07 NOTE — ECG
[facility]

                                       

Test Date:    2025               Test Time:    12:24:49

Pat Name:     ANURAG COLMENARES        Department:   

Patient ID:   [id number]           Room:         0264 A

Gender:       F                        Technician:   SERGEI

:          1957               Requested By: YOANDY CASE

Order Number: [id number]        Reading MD:   Calvin Renae

                                 Measurements

Intervals                              Axis          

Rate:         49                       P:            64

MS:           148                      QRS:          51

QRSD:         72                       T:            23

QT:           462                                    

QTc:          417                                    

                           Interpretive Statements

Marked sinus bradycardia

Low voltage QRS

Nonspecific ST and T wave abnormality

Electronically Signed On 3-7-2025 13:38:16 PST by Calvin Renae



Please click the below link to view image of tracing.

## 2025-03-07 NOTE — DVHPN2
Subjective


Patient denies any symptoms.  Reports that her abdominal pain has improved


Reviewed:  H&P


Changes from previous H/P or p:  No Changes


General:  Per HPI


Respiratory:  Shortness of breath


Gastrointestinal:  Abdominal Pain, Diarrhea





Objective


Vitals





Vital Signs








  Date Time  Temp Pulse Resp B/P (MAP) Pulse Ox O2 Delivery O2 Flow Rate FiO2


 


3/7/25 06:30  54 15 100/49 (66) 96   


 


3/7/25 06:00      Nasal Cannula* 1 24


 


3/7/25 04:00 97.6       





 97.6       








Intake/Output











                               Intake and Output 


 


 3/7/25





 07:00


 


Intake Total 1037.194 ml


 


Output Total 1000 ml


 


Balance 37.194 ml


 


 


 


Intake Oral 450 ml


 


IV Total 587.194 ml


 


Output Urine Total 1000 ml








General Appearance:  Alert, Oriented X3, Cooperative


HEENT:  Atraumatic, PERRLA


Lungs:  Clear to auscultation, Normal air movement


Cardiovascular:  Normal S1, Normal S2


Abdomen:  Normal bowel sounds, Soft, No hepatospenomegaly, Other (Tenderness 

noted to left lower:)


Musculoskeletal:  Normal sensory function, Normal motor function


Neuro:  Cranial nerves 3-12 NL


Skin:  Dry, Intact


Psych/Mental Status:  Mental status NL, Mood NL


Medications





                               Current Medications








 Medications  Dose


 Ordered  Sig/Kiran


 Route  Start Time


 Stop Time Status Last Admin


Dose Admin


 


 Ondansetron HCl  4 mg  Q4HP  PRN


 IV  3/1/25 11:30


    3/4/25 12:06


4 MG


 


 Acetaminophen  650 mg  Q6HP  PRN


 PO  3/1/25 11:30


     





 


 Rifaximin  550 mg  BID


 PO  3/1/25 22:00


    3/6/25 22:02


550 MG


 


 Sucralfate  0.5 gm  QID


 PO  3/1/25 12:00


    3/7/25 06:30


0.5 GM


 


 Hydroxyzine


 Pamoate  25 mg  BID  PRN


 PO  3/1/25 13:00


     





 


 Patient Own


 Medication  1 tab  TID


 PO  3/1/25 14:00


     





 


 Spironolactone  25 mg  DAILY


 PO  3/2/25 10:00


    3/6/25 09:22


25 MG


 


 Pantoprazole


 Sodium  40 mg  DAILY@0600


 PO  3/2/25 06:00


    3/7/25 06:30


40 MG


 


 Midodrine  10 mg  TID@0600,1200,1800


 PO  3/3/25 12:00


    3/7/25 06:30


10 MG


 


 Lactulose  30 ml  DAILY


 PO  3/4/25 10:00


    3/4/25 10:48


30 ML


 


 Methylprednisolone


 Sodium Succinate  40 mg  DAILY


 IV  3/4/25 10:00


    3/6/25 09:23


40 MG


 


 Furosemide  20 mg  DAILY


 IV  3/4/25 10:00


    3/6/25 11:15


20 MG


 


 Nicardipine HCl  250 ml @ 


 50 mls/hr  Q5H


 IV  3/4/25 03:45


   Cancel  





 


 Azithromycin  250 ml @ 


 125 mls/hr  DAILY


 IV  3/4/25 10:30


    3/6/25 12:59


125 MLS/HR


 


 Norepinephrine


 Bitartrate  250 ml @ 


 0.938 mls/


 hr  Q24H


 IV  3/4/25 12:00


    3/6/25 13:09


0.938 MLS/HR


 


 Potassium Chloride  10 meq  DAILY


 PO  3/6/25 10:00


    3/6/25 09:21


10 MEQ


 


 Ceftriaxone Sodium  50 ml @ 


 100 mls/hr  DAILY@09


 IV  3/7/25 09:00


     














Laboratory Results


Laboratory Tests


3/7/25 04:45











Chemistry








Test


 3/7/25


04:45


 


Albumin


 3.3 g/dL


(3.2-4.8)


 


Calcium Level


 9.9 mg/dL


(8.7-10.4)


 


Total Protein


 5.5 g/dL


(5.7-8.2)  L








LFT








Test


 3/7/25


04:45


 


Alanine Aminotransferase (ALT)


 61 U/L (7-40)


H


 


Alkaline Phosphatase


 254 U/L


()  H


 


Aspartate Amino Transferase


(AST) 80 U/L (13-40)


H


 


Total Bilirubin


 2.2 mg/dL


(0.2-1.0)  H








Urinalysis








Test


 3/5/25


22:15


 


Urine Color


 Light-yellow


(Yellow)


 


Urine Clarity Clear (Clear)  


 


Urine pH 5.0 (5.0-9.0)  


 


Urine Specific Gravity


 1.013


(1.001-1.035)


 


Urine Protein


 Negative


(Negative)


 


Urine Ketones


 Trace


(Negative)


 


Urine Blood


 Negative /uL


(Negative)


 


Urine Nitrite


 Negative


(Negative)


 


Urine Bilirubin


 Negative


(Negative)


 


Urine Urobilinogen


 Normal mg/dL


(Negative)


 


Urine Leukocyte Esterase


 Negative /uL


(Negative)


 


Urine RBC


 None seen /hpf


(0 - 4)


 


Urine Microscopic WBC  /HPF (0-5)  


 


Urine Squamous Epithelial


Cells Few /hpf (<5)  





 


Urine Bacteria


 Few /hpf (None


Seen)  H


 


Urine Mucus


 Few (None


Seen)


 


Urine Glucose


 Normal mg/dL


(Normal)








Microbiology





                                  Microbiology








 Date/Time


Source Procedure


Growth Status





 


 3/5/25 09:45


Ascities Fluid Gram Stain - Final


 Resulted


 


 3/5/25 09:45


Ascities Fluid Body Fluid Culture - Preliminary


 Resulted


 


 3/5/25 06:55


Nose MRSA Screen - Final


 Complete


 


 3/4/25 21:41


Urine - Catheterized Urine Culture - Preliminary


 Resulted


 


 3/1/25 11:33


Blood Blood Culture - Final


NO GROWTH AFTER 5 DAYS OF INCUBATION. Complete








Labs and/or images reviewed:  Labs reviewed by me, Image(s) reviewed by me





Assessment/Plan


Assessment/Plan


Impression:


-septic shock


-community-acquired pneumonia, probable Gram-positive/Gram-negative etiology.  


-cirrhosis of the liver with portal hypertension


-abdominal pain


-obesity 


-thrombocytopenia


-leukocytosis 


-rule out gastritis





Plan:


Events:  Patient now off norepinephrine.  Blood pressure acceptable.  Cardiology

consultation obtained with recommendations reviewed.


-DVT prophylaxis


-continue midodrine 


-continue antibiotic therapy with Zosyn , add azithromycin


-hold anticoagulation given thrombocytopenia 


-continue diuresis with spironolactone


-PPI, Carafate 


-repeat labs in a.m.


-potassium replacement


-physical therapy evaluation


-transferred to telemetry unit





Critical care time spent with patient discussing and formulating plan of care: 

40 minutes.  This does not include time spent performing procedures.





This medical document was created using an electronic medical record system with

Dragon computerized dictation system.  Although this document has been carefully

reviewed, there may still be some phonetic and typographical errors.  These 

areas are purely typographical due to imperfections of the software programs, 

and do not reflect any compromise in the patient's medical care.


Plan discussed with:  Patient, Other (RN)


My Orders





                          Orders - YOANDY CASE NP








Procedure Category Date Status





  Time 


 


Ceftriaxone 1gm/50ml PHA 3/7/25 In Process





D5w (Rocephin)  09:00 


 


* Cardiology Consult CONS 3/6/25 Transmitted





  11:03 


 


Electrocardigram EKG 3/6/25 Logged





  12:20 











Date of Service:  Mar 7, 2025


Billing Provider:  YOANDY CASE NP


Common Visit Codes:  [id number] INP/OBS CARE(HIGH)











YOANDY CASE NP             Mar 7, 2025 09:20

## 2025-03-07 NOTE — ECG
[facility]

                                       

Test Date:    2025               Test Time:    17:35:28

Pat Name:     ANURAG COLMENARES        Department:   

Patient ID:   [id number]           Room:         0202T

Gender:       F                        Technician:   ELIZABETH

:          1957               Requested By: YOANDY CASE

Order Number: [id number]        Reading MD:   Calvin Renae

                                 Measurements

Intervals                              Axis          

Rate:         55                       P:            24

WA:           162                      QRS:          60

QRSD:         86                       T:            50

QT:           482                                    

QTc:          461                                    

                           Interpretive Statements

Sinus bradycardia

Low voltage QRS

RSR' or QR pattern in V1 suggests right ventricular conduction delay

Electronically Signed On 3-8-2025 16:31:11 PST by Calvin Renae



Please click the below link to view image of tracing.

## 2025-03-08 NOTE — DVHPN2
Reviewed:  Care Plan, H&P


Changes from previous H/P or p:  No Changes


General:  Per HPI


Respiratory:  Shortness of breath


Gastrointestinal:  Abdominal Pain, Diarrhea





Objective


Vitals





Vital Signs








  Date Time  Temp Pulse Resp B/P (MAP) Pulse Ox O2 Delivery O2 Flow Rate FiO2


 


3/8/25 09:09    113/57    


 


3/8/25 09:00 97.6 67 15  95   





 97.6       


 


3/8/25 08:00      Room Air* 0 21








Intake/Output











                               Intake and Output 


 


 3/8/25





 07:00


 


Intake Total 1300 ml


 


Output Total 650 ml


 


Balance 650 ml


 


 


 


Intake Oral 1000 ml


 


IV Total 300 ml


 


Output Urine Total 650 ml


 


# Bowel Movements 1








General Appearance:  Alert, Oriented X3, Cooperative


HEENT:  Atraumatic, PERRLA


Lungs:  Clear to auscultation, Normal air movement


Cardiovascular:  Normal S1, Normal S2


Abdomen:  Normal bowel sounds, Soft, No hepatospenomegaly, Other (Tenderness 

noted to left lower:)


Musculoskeletal:  Normal sensory function, Normal motor function


Neuro:  Cranial nerves 3-12 NL


Skin:  Dry, Intact


Psych/Mental Status:  Mental status NL, Mood NL


Medications





                               Current Medications








 Medications  Dose


 Ordered  Sig/Kiran


 Route  Start Time


 Stop Time Status Last Admin


Dose Admin


 


 Ondansetron HCl  4 mg  Q4HP  PRN


 IV  3/1/25 11:30


    3/4/25 12:06


4 MG


 


 Acetaminophen  650 mg  Q6HP  PRN


 PO  3/1/25 11:30


     





 


 Rifaximin  550 mg  BID


 PO  3/1/25 22:00


    3/8/25 09:03


550 MG


 


 Sucralfate  0.5 gm  QID


 PO  3/1/25 12:00


    3/8/25 05:07


0.5 GM


 


 Patient Own


 Medication  1 tab  TID


 PO  3/1/25 14:00


     





 


 Spironolactone  25 mg  DAILY


 PO  3/2/25 10:00


    3/8/25 09:03


25 MG


 


 Pantoprazole


 Sodium  40 mg  DAILY@0600


 PO  3/2/25 06:00


    3/8/25 05:07


40 MG


 


 Midodrine  10 mg  TID@0600,1200,1800


 PO  3/3/25 12:00


    3/8/25 05:07


10 MG


 


 Lactulose  30 ml  DAILY


 PO  3/4/25 10:00


    3/8/25 09:03


30 ML


 


 Methylprednisolone


 Sodium Succinate  40 mg  DAILY


 IV  3/4/25 10:00


    3/8/25 09:03


40 MG


 


 Furosemide  20 mg  DAILY


 IV  3/4/25 10:00


    3/8/25 09:09


20 MG


 


 Nicardipine HCl  250 ml @ 


 50 mls/hr  Q5H


 IV  3/4/25 03:45


   Cancel  





 


 Azithromycin  250 ml @ 


 125 mls/hr  DAILY


 IV  3/4/25 10:30


    3/7/25 10:09


125 MLS/HR


 


 Potassium Chloride  10 meq  DAILY


 PO  3/6/25 10:00


    3/8/25 09:02


10 MEQ


 


 Ceftriaxone Sodium  50 ml @ 


 100 mls/hr  DAILY@09


 IV  3/7/25 09:00


    3/8/25 09:04


100 MLS/HR











Laboratory Results


Laboratory Tests


3/8/25 05:58











Chemistry








Test


 3/8/25


05:58


 


Calcium Level


 9.8 mg/dL


(8.7-10.4)








Urinalysis








Test


 3/5/25


22:15


 


Urine Color


 Light-yellow


(Yellow)


 


Urine Clarity Clear (Clear)  


 


Urine pH 5.0 (5.0-9.0)  


 


Urine Specific Gravity


 1.013


(1.001-1.035)


 


Urine Protein


 Negative


(Negative)


 


Urine Ketones


 Trace


(Negative)


 


Urine Blood


 Negative /uL


(Negative)


 


Urine Nitrite


 Negative


(Negative)


 


Urine Bilirubin


 Negative


(Negative)


 


Urine Urobilinogen


 Normal mg/dL


(Negative)


 


Urine Leukocyte Esterase


 Negative /uL


(Negative)


 


Urine RBC


 None seen /hpf


(0 - 4)


 


Urine Microscopic WBC  /HPF (0-5)  


 


Urine Squamous Epithelial


Cells Few /hpf (<5)  





 


Urine Bacteria


 Few /hpf (None


Seen)  H


 


Urine Mucus


 Few (None


Seen)


 


Urine Glucose


 Normal mg/dL


(Normal)








Microbiology





                                  Microbiology








 Date/Time


Source Procedure


Growth Status





 


 3/5/25 09:45


Ascities Fluid Gram Stain - Final


 Resulted


 


 3/5/25 09:45


Ascities Fluid Body Fluid Culture - Preliminary


 Resulted


 


 3/5/25 06:55


Nose MRSA Screen - Final


 Complete


 


 3/4/25 21:41


Urine - Catheterized Urine Culture - Final


 Complete


 


 3/1/25 11:33


Blood Blood Culture - Final


NO GROWTH AFTER 5 DAYS OF INCUBATION. Complete








Labs and/or images reviewed:  Labs reviewed by me, Image(s) reviewed by me





Assessment/Plan


Assessment/Plan


Covering for NUVIA Vila 





-septic shock


-community-acquired pneumonia, probable Gram-positive/Gram-negative etiology.  

:: Continue antibiotics Zosyn azithromycin


-cirrhosis of the liver with portal hypertension


-abdominal pain


-obesity 


-thrombocytopenia


-leukocytosis 


-rule out gastritis


Hypotension:  Midodrine





Time spent 55 minutes


Plan discussed with:  Patient





Date of Service:  Mar 8, 2025


Billing Provider:  YARELIS ROJAS MD


Common Visit Codes:  [id number] INP/OBS CARE(HIGH)











YARELIS ROJAS MD                 Mar 8, 2025 13:11

## 2025-03-08 NOTE — ECG
[facility]

                                       

Test Date:    2025               Test Time:    12:25:23

Pat Name:     ANURAG COLMENARES        Department:   

Patient ID:   [id number]           Room:         0202T A

Gender:       F                        Technician:   SERGEI

:          1957               Requested By: YOANDY CASE

Order Number: [id number]        Reading MD:   Calvin Renae

                                 Measurements

Intervals                              Axis          

Rate:         48                       P:            13

PA:           144                      QRS:          47

QRSD:         74                       T:            17

QT:           490                                    

QTc:          437                                    

                           Interpretive Statements

Marked sinus bradycardia

Low voltage QRS

Nonspecific T wave abnormality

Electronically Signed On 3-8-2025 16:29:55 PST by Calvin Renae



Please click the below link to view image of tracing.

## 2025-03-08 NOTE — DVHPN2
Progress Note - Dictate


Date Seen:  Mar 8, 2025


Medical Necessity Reason


Pt with a Central, PICC or Fol:  No


Subjective


Patient is stable awake and arousable ; one bowel movement recorded


Patient tends to run low blood pressures but currently off pressors 


Patient is getting a low-dose Lasix 20 mg IV daily and also spironolactone 50 mg

p.o. q.h.s. 


Patient is on IV antibiotics


Steroids, ursodiol and Protonix on board


CBC is stable H&H stable and liver enzymes generally are trending downwards with

a bilirubin down to 2.2


vital signs





                                   Vital Sign








  Date Time  Temp Pulse Resp B/P (MAP) Pulse Ox O2 Delivery O2 Flow Rate FiO2


 


3/8/25 13:00 97.4 62 16 107/65 (79) 96   





 97.4       


 


3/8/25 08:00      Room Air* 0 21














                           Total Intake and Output   


 


 3/7/25 3/7/25 3/8/25





 15:00 23:00 07:00


 


Intake Total 300 ml 1000 ml 0 ml


 


Output Total  450 ml 200 ml


 


Balance 300 ml 550 ml -200 ml








medications





                               Current Medications








 Medications  Dose


 Ordered  Sig/Kiran


 Route  Start Time


 Stop Time Status Last Admin


Dose Admin


 


 Ondansetron HCl  4 mg  Q4HP  PRN


 IV  3/1/25 11:30


    3/4/25 12:06


4 MG


 


 Acetaminophen  650 mg  Q6HP  PRN


 PO  3/1/25 11:30


     





 


 Rifaximin  550 mg  BID


 PO  3/1/25 22:00


    3/8/25 09:03


550 MG


 


 Sucralfate  0.5 gm  QID


 PO  3/1/25 12:00


    3/8/25 13:29


0.5 GM


 


 Patient Own


 Medication  1 tab  TID


 PO  3/1/25 14:00


     





 


 Spironolactone  25 mg  DAILY


 PO  3/2/25 10:00


    3/8/25 09:03


25 MG


 


 Pantoprazole


 Sodium  40 mg  DAILY@0600


 PO  3/2/25 06:00


    3/8/25 05:07


40 MG


 


 Midodrine  10 mg  TID@0600,1200,1800


 PO  3/3/25 12:00


    3/8/25 13:29


10 MG


 


 Lactulose  30 ml  DAILY


 PO  3/4/25 10:00


    3/8/25 09:03


30 ML


 


 Methylprednisolone


 Sodium Succinate  40 mg  DAILY


 IV  3/4/25 10:00


    3/8/25 09:03


40 MG


 


 Furosemide  20 mg  DAILY


 IV  3/4/25 10:00


    3/8/25 09:09


20 MG


 


 Nicardipine HCl  250 ml @ 


 50 mls/hr  Q5H


 IV  3/4/25 03:45


   Cancel  





 


 Azithromycin  250 ml @ 


 125 mls/hr  DAILY


 IV  3/4/25 10:30


    3/8/25 11:00


125 MLS/HR


 


 Potassium Chloride  10 meq  DAILY


 PO  3/6/25 10:00


    3/8/25 09:02


10 MEQ


 


 Ceftriaxone Sodium  50 ml @ 


 100 mls/hr  DAILY@09


 IV  3/7/25 09:00


    3/8/25 09:04


100 MLS/HR








objective


Patient seen at bedside, awake and arousable but lethargic


Mild scleral icterus improved from before


Respiratory:  Normal air movement


Cardiovascular:  Normal S1, Normal S2, No murmurs


Abdomen is soft slightly distended but there was no significant ascites or 

tenderness at this time


Skin:  No significant lesion


Neuro:  Sensation intact


laboratory and microbiology


                                Laboratory Tests


3/8/25 05:58

















Test


 3/8/25


05:58 Range/Units


 


 


Serum Glucose 121 H   mg/dL








Problems(with codes):  


(1) Jaundice


(2) Autoimmune hepatitis


(3) Ascites


(4) ANA (acute kidney injury)


(5) Hypotension


(6) Transaminitis


(7) Abdominal pain


(8) Cirrhosis of liver


Prognosis


Plan





Decrease methylprednisolone to 20 mg IV daily


Then we can change her over to oral prednisone


Continue ursodiol 300 mg p.o. twice a day


Continue 2 g sodium diet 


Overall prognosis remains guarded and continue supportive care 


I will arrange regular outpatient paracentesis for her at Radiology through my 

clinic and hopefully prevent readmissions for the same





Dietary Evaluation Review


Comments:  


Encourage high fiber foods, consider Renal diet with 60g protein  


restriction is kidney function worsens.


Expected Outcomes/Goals:  


regluar BMs


Plan discussed with:  Other











PAL MAN MD                 Mar 8, 2025 14:54

## 2025-03-09 NOTE — DVHPN2
Progress Note - Dictate


Date Seen:  Mar 9, 2025


Medical Necessity Reason


Pt with a Central, PICC or Fol:  No


Subjective


Patient is clinically much improved, there is less scleral icterus.  Patient is 

awake alert


Patient is off pressors


Patient is getting a low-dose Lasix 20 mg IV daily and also spironolactone 50 mg

p.o. q.h.s. 


Patient is on IV antibiotics


Steroids, ursodiol and Protonix on board


CBC is stable H&H stable 


Liver enzyme for persistently elevated with increasing transaminases and with a 

bilirubin down to 2.2


vital signs





                                   Vital Sign








  Date Time  Temp Pulse Resp B/P (MAP) Pulse Ox O2 Delivery O2 Flow Rate FiO2


 


3/9/25 21:00 97.7 57 18 105/49 (67) 97   





 97.7       


 


3/9/25 08:00      Nasal Cannula* 2 28














                           Total Intake and Output   


 


 3/8/25 3/8/25 3/9/25





 15:00 23:00 07:00


 


Intake Total 300 ml 480 ml 600 ml


 


Output Total  575 ml 400 ml


 


Balance 300 ml -95 ml 200 ml








medications





                               Current Medications








 Medications  Dose


 Ordered  Sig/Kiran


 Route  Start Time


 Stop Time Status Last Admin


Dose Admin


 


 Ondansetron HCl  4 mg  Q4HP  PRN


 IV  3/1/25 11:30


    3/4/25 12:06


4 MG


 


 Acetaminophen  650 mg  Q6HP  PRN


 PO  3/1/25 11:30


     





 


 Rifaximin  550 mg  BID


 PO  3/1/25 22:00


    3/9/25 09:29


550 MG


 


 Sucralfate  0.5 gm  QID


 PO  3/1/25 12:00


    3/9/25 17:42


0.5 GM


 


 Patient Own


 Medication  1 tab  TID


 PO  3/1/25 14:00


     





 


 Spironolactone  25 mg  DAILY


 PO  3/2/25 10:00


    3/8/25 09:03


25 MG


 


 Pantoprazole


 Sodium  40 mg  DAILY@0600


 PO  3/2/25 06:00


    3/9/25 05:34


40 MG


 


 Midodrine  10 mg  TID@0600,1200,1800


 PO  3/3/25 12:00


    3/9/25 17:42


10 MG


 


 Lactulose  30 ml  DAILY


 PO  3/4/25 10:00


    3/8/25 09:03


30 ML


 


 Furosemide  20 mg  DAILY


 IV  3/4/25 10:00


    3/8/25 09:09


20 MG


 


 Nicardipine HCl  250 ml @ 


 50 mls/hr  Q5H


 IV  3/4/25 03:45


   Cancel  





 


 Azithromycin  250 ml @ 


 125 mls/hr  DAILY


 IV  3/4/25 10:30


    3/9/25 09:29


125 MLS/HR


 


 Potassium Chloride  10 meq  DAILY


 PO  3/6/25 10:00


    3/9/25 09:29


10 MEQ


 


 Ceftriaxone Sodium  50 ml @ 


 100 mls/hr  DAILY@09


 IV  3/7/25 09:00


    3/9/25 08:24


100 MLS/HR


 


 Methylprednisolone


 Sodium Succinate  20 mg  DAILY


 IV  3/9/25 10:00


    3/9/25 09:28


20 MG








objective


Patient seen at bedside, awake and arousable but lethargic


Mild scleral icterus improved from before


Respiratory:  Normal air movement


Cardiovascular:  Normal S1, Normal S2, No murmurs


Abdomen is soft slightly distended;mild  ascites, no tenderness at this time


Skin:  No significant lesion


Neuro:  Sensation intact


laboratory and microbiology


                                Laboratory Tests


3/8/25 05:58

















Test


 3/8/25


05:58 Range/Units


 


 


Serum Glucose 121 H   mg/dL








Problems(with codes):  


(1) Jaundice


(2) Scleroderma


(3) Autoimmune hepatitis


(4) Ascites


(5) ANA (acute kidney injury)


(6) Transaminitis


(7) Hypotension


(8) Abdominal pain


(9) Cirrhosis of liver


(10) Lactic acidosis


Prognosis


Plan





Discharge planning as per hospitalist 


Patient will need to be maintained on prednisone, ursodiol 


We could check a TPMT phenotype and if possible give her a trial of Imuran 50 mg

p.o. daily 


Patient can follow up in my office as an outpatient and I will arrange 

outpatient paracentesis on a weekly basis


Overall prognosis remains guarded, patient appears to have a good support system





Dietary Evaluation Review


Comments:  


Encourage high fiber foods, consider Renal diet with 60g protein  


restriction is kidney function worsens.


Expected Outcomes/Goals:  


regluar BMs


Plan discussed with:  Patient, Daughter











PAL MAN MD                 Mar 9, 2025 21:47

## 2025-03-09 NOTE — DVHPN2
Reviewed:  Care Plan, H&P


Changes from previous H/P or p:  No Changes


General:  Per HPI


Respiratory:  Shortness of breath


Gastrointestinal:  Abdominal Pain, Diarrhea





Objective


Vitals





Vital Signs








  Date Time  Temp Pulse Resp B/P (MAP) Pulse Ox O2 Delivery O2 Flow Rate FiO2


 


3/9/25 08:48 98.7 59 16 113/55 (74) 95   





 98.7       


 


3/8/25 20:00      Room Air* 0 21








Intake/Output











                               Intake and Output 


 


 3/9/25





 07:00


 


Intake Total 1380 ml


 


Output Total 975 ml


 


Balance 405 ml


 


 


 


Intake Oral 1080 ml


 


IV Total 300 ml


 


Output Urine Total 975 ml








General Appearance:  Alert, Oriented X3, Cooperative


HEENT:  Atraumatic, PERRLA


Lungs:  Clear to auscultation, Normal air movement


Cardiovascular:  Normal S1, Normal S2


Abdomen:  Normal bowel sounds, Soft, No hepatospenomegaly, Other (Tenderness 

noted to left lower:)


Musculoskeletal:  Normal sensory function, Normal motor function


Neuro:  Cranial nerves 3-12 NL


Skin:  Dry, Intact


Psych/Mental Status:  Mental status NL, Mood NL


Medications





                               Current Medications








 Medications  Dose


 Ordered  Sig/Kiran


 Route  Start Time


 Stop Time Status Last Admin


Dose Admin


 


 Ondansetron HCl  4 mg  Q4HP  PRN


 IV  3/1/25 11:30


    3/4/25 12:06


4 MG


 


 Acetaminophen  650 mg  Q6HP  PRN


 PO  3/1/25 11:30


     





 


 Rifaximin  550 mg  BID


 PO  3/1/25 22:00


    3/8/25 21:36


550 MG


 


 Sucralfate  0.5 gm  QID


 PO  3/1/25 12:00


    3/9/25 05:36


0.5 GM


 


 Patient Own


 Medication  1 tab  TID


 PO  3/1/25 14:00


     





 


 Spironolactone  25 mg  DAILY


 PO  3/2/25 10:00


    3/8/25 09:03


25 MG


 


 Pantoprazole


 Sodium  40 mg  DAILY@0600


 PO  3/2/25 06:00


    3/9/25 05:34


40 MG


 


 Midodrine  10 mg  TID@0600,1200,1800


 PO  3/3/25 12:00


    3/9/25 05:34


10 MG


 


 Lactulose  30 ml  DAILY


 PO  3/4/25 10:00


    3/8/25 09:03


30 ML


 


 Furosemide  20 mg  DAILY


 IV  3/4/25 10:00


    3/8/25 09:09


20 MG


 


 Nicardipine HCl  250 ml @ 


 50 mls/hr  Q5H


 IV  3/4/25 03:45


   Cancel  





 


 Azithromycin  250 ml @ 


 125 mls/hr  DAILY


 IV  3/4/25 10:30


    3/8/25 11:00


125 MLS/HR


 


 Potassium Chloride  10 meq  DAILY


 PO  3/6/25 10:00


    3/8/25 09:02


10 MEQ


 


 Ceftriaxone Sodium  50 ml @ 


 100 mls/hr  DAILY@09


 IV  3/7/25 09:00


    3/9/25 08:24


100 MLS/HR


 


 Methylprednisolone


 Sodium Succinate  20 mg  DAILY


 IV  3/9/25 10:00


     














Laboratory Results


Laboratory Tests


3/8/25 05:58











Urinalysis








Test


 3/5/25


22:15


 


Urine Color


 Light-yellow


(Yellow)


 


Urine Clarity Clear (Clear)  


 


Urine pH 5.0 (5.0-9.0)  


 


Urine Specific Gravity


 1.013


(1.001-1.035)


 


Urine Protein


 Negative


(Negative)


 


Urine Ketones


 Trace


(Negative)


 


Urine Blood


 Negative /uL


(Negative)


 


Urine Nitrite


 Negative


(Negative)


 


Urine Bilirubin


 Negative


(Negative)


 


Urine Urobilinogen


 Normal mg/dL


(Negative)


 


Urine Leukocyte Esterase


 Negative /uL


(Negative)


 


Urine RBC


 None seen /hpf


(0 - 4)


 


Urine Microscopic WBC  /HPF (0-5)  


 


Urine Squamous Epithelial


Cells Few /hpf (<5)  





 


Urine Bacteria


 Few /hpf (None


Seen)  H


 


Urine Mucus


 Few (None


Seen)


 


Urine Glucose


 Normal mg/dL


(Normal)








Microbiology





                                  Microbiology








 Date/Time


Source Procedure


Growth Status





 


 3/5/25 09:45


Ascities Fluid Gram Stain - Final


 Resulted


 


 3/5/25 09:45


Ascities Fluid Body Fluid Culture - Preliminary


 Resulted


 


 3/5/25 06:55


Nose MRSA Screen - Final


 Complete


 


 3/4/25 21:41


Urine - Catheterized Urine Culture - Final


 Complete


 


 3/1/25 11:33


Blood Blood Culture - Final


NO GROWTH AFTER 5 DAYS OF INCUBATION. Complete








Labs and/or images reviewed:  Labs reviewed by me, Image(s) reviewed by me





Assessment/Plan


Assessment/Plan


Covering for NUVIA Vila 





-septic shock


-community-acquired pneumonia, probable Gram-positive/Gram-negative etiology.  

:: Continue antibiotics Zosyn azithromycin


-cirrhosis of the liver with portal hypertension


-abdominal pain


-obesity 


-thrombocytopenia


-leukocytosis 


-rule out gastritis


Hypotension:  Midodrine


Continue current management


Time spent 55 minutes


Plan discussed with:  Patient





Date of Service:  Mar 9, 2025


Billing Provider:  YARELIS ROJAS MD


Common Visit Codes:  [id number] INP/OBS CARE(HIGH)











YARELIS ROJAS MD                 Mar 9, 2025 09:33

## 2025-03-10 NOTE — DVH
EXAM: XY CHEST XRAY 1 VIEW



Indication: CHF



Technique: Single frontal view of the chest was obtained



Comparison: XY CHEST XRAY 1 VIEW on DOS: 3/2/25, XY CHEST XRAY 1 VIEW on DOS: 2/4/25, XY CHEST PORTAB
LE on DOS: 2/2/25, XY CHEST PORTABLE on DOS: 2/1/25, XY CHEST PORTABLE on DOS: 1/19/25



FINDINGS: 



Lines and Tubes: Left internal jugular central venous catheter tip projects over the superior vena ca
va.



Lungs: Bibasilar opacities.



Pleura: Small bilateral pleural effusions.



No pneumothorax. 



Cardiomediastinal contours: Unremarkable



Bones: No acute osseous abnormality.



IMPRESSION:



Small bilateral pleural effusions and bibasilar opacities.



Electronically Signed by: Princess Daniel at 03/10/2025 11:51:43 AM

## 2025-03-10 NOTE — DVH
ULTRASOUND ABDOMEN limited, 4 QUADRANTS



INDICATION: Assess ascites



Evaluate for ascites.



TECHNIQUE: 



The four quadrants of the abdomen were scanned in grey-scale to assess for the presence of ascites. N
o solid organ assessment was performed.



FINDINGS/IMPRESSIONS: 



Small volume ascites.



Electronically Signed by: Princess Daniel at 03/10/2025 11:58:27 AM

## 2025-03-10 NOTE — DVHPN2
Progress Note


Date Seen:  Mar 10, 2025


Resident Creating Document:  HAMILTON LAMAS RESIDENT


Medical Necessity Reason


Pt with a Central, PICC or Fol:  No





Subjective


Review of Systems


Patient is clinically much improved, there is less scleral icterus.  Patient is 

awake alert


Patient is off pressors


Patient is getting a low-dose Lasix 20 mg IV daily and also spironolactone 25 mg

p.o. q.h.s. 


Steroids, ursodiol and Protonix on board


Total bilirubin stable at 2.2, however, rise in AST 2-67  and .





Objective


vital signs





                                   Vital Sign








  Date Time  Temp Pulse Resp B/P (MAP) Pulse Ox O2 Delivery O2 Flow Rate FiO2


 


3/10/25 08:59    95/34    


 


3/10/25 08:44 97.9 57 16  97   





 97.9       


 


3/10/25 07:30      Nasal Cannula* 2 28














                           Total Intake and Output   


 


 3/9/25 3/9/25 3/10/25





 15:00 23:00 07:00


 


Intake Total 300 ml 600 ml 400 ml


 


Output Total  200 ml 300 ml


 


Balance 300 ml 400 ml 100 ml








medications





                               Current Medications








 Medications  Dose


 Ordered  Sig/Kiran


 Route  Start Time


 Stop Time Status Last Admin


Dose Admin


 


 Ondansetron HCl  4 mg  Q4HP  PRN


 IV  3/1/25 11:30


    3/4/25 12:06


4 MG


 


 Acetaminophen  650 mg  Q6HP  PRN


 PO  3/1/25 11:30


     





 


 Rifaximin  550 mg  BID


 PO  3/1/25 22:00


    3/10/25 08:58


550 MG


 


 Sucralfate  0.5 gm  QID


 PO  3/1/25 12:00


    3/10/25 12:10


0.5 GM


 


 Patient Own


 Medication  1 tab  TID


 PO  3/1/25 14:00


    3/10/25 06:18


1 TAB


 


 Spironolactone  25 mg  DAILY


 PO  3/2/25 10:00


    3/10/25 08:58


25 MG


 


 Pantoprazole


 Sodium  40 mg  DAILY@0600


 PO  3/2/25 06:00


    3/10/25 06:17


40 MG


 


 Midodrine  10 mg  TID@0600,1200,1800


 PO  3/3/25 12:00


    3/10/25 12:10


10 MG


 


 Lactulose  30 ml  DAILY


 PO  3/4/25 10:00


    3/10/25 08:59


30 ML


 


 Furosemide  20 mg  DAILY


 IV  3/4/25 10:00


    3/10/25 08:59


20 MG


 


 Nicardipine HCl  250 ml @ 


 50 mls/hr  Q5H


 IV  3/4/25 03:45


   Cancel  





 


 Azithromycin  250 ml @ 


 125 mls/hr  DAILY


 IV  3/4/25 10:30


    3/10/25 09:59


125 MLS/HR


 


 Potassium Chloride  10 meq  DAILY


 PO  3/6/25 10:00


    3/10/25 08:58


10 MEQ


 


 Ceftriaxone Sodium  50 ml @ 


 100 mls/hr  DAILY@09


 IV  3/7/25 09:00


    3/10/25 08:59


100 MLS/HR


 


 Methylprednisolone


 Sodium Succinate  20 mg  DAILY


 IV  3/9/25 10:00


    3/10/25 08:59


20 MG








Examination


General Appearance:  Cooperative. Well developed. Well nourished.  NAD


Head Exam:  Normal inspection.  Mild scleral icterus noted


Neck Exam:  Normal inspection. Non-tender. Normal alignment


Pulmonary/Respiratory:  Chest non-tender. Clear bilateral breath sounds, no 

crackles, no wheezing.


Cardiovascular/Chest: Regular rate and rhythm.  No murmurs.  No JVD.


Abdominal Exam:  Normal bowel sounds. Soft.  normal abdomen, trace dullness to 

percussion.  no visible veins, Nontender.  No hepatospenomegaly.  No masses


Lower extremities: Negative lower extremity edema


Thoughts/Psych:  Normal thought pattern.  Appropriate mood and affect.  Good 

judgement and insight


Skin Exam:  Normal inspection. Normal color. Warm. Dry


laboratory and microbiology


                                Laboratory Tests


3/8/25 05:58

















Test


 3/8/25


05:58 Range/Units


 


 


Serum Glucose 121 H   mg/dL








                                  Microbiology








 Date/Time


Source Procedure


Growth Status





 


 3/5/25 09:45


Ascities Fluid Gram Stain - Final


 Complete


 


 3/5/25 09:45


Ascities Fluid Body Fluid Culture - Final


 Complete


 


 3/5/25 06:55


Nose MRSA Screen - Final


 Complete


 


 3/4/25 21:41


Urine - Catheterized Urine Culture - Final


 Complete


 


 3/1/25 11:33


Blood Blood Culture - Final


NO GROWTH AFTER 5 DAYS OF INCUBATION. Complete











Problem List/Assessment/Plan


Problem List/Assessment/Plan


Jaundice 


Autoimmune hepatitis


Ascites


Scleroderma


ANA 


Transaminitis 


Hypertension 


Liver cirrhosis 


Lactic acidosis








Plan: 


Increased IV methylprednisolone to 40 mg as patient LFTs trending upwards after 

bringing methylprednisolone down to 20 mg.


Continue spironolactone 25 mg p.o. daily, continue rifaximin


Discharge planning as per hospitalist 


Patient will need to be maintained on prednisone, ursodiol 


We could check a TPMT phenotype and if possible give her a trial of Imuran 50 mg

p.o. daily 


Patient can follow up with GI in the outpatient clinic for regular paracentesis,

appointment scheduled for 03/18/2025.  We will try to arrange a sooner 

appointment.


Overall prognosis remains guarded, patient appears to have a good support system








Plan discussed with patient and daughter at bedside


Plan discussed with Dr. Moeller


Plan discussed with:  Patient, Daughter, Other (RN)





My Orders


My Orders





                           Orders - HAMILTON LAMAS RESIDENT








Procedure Category Date Status





  Time 


 


Methylprednisolone PHA 3/11/25 Transmitted





Sod Succ (Solu Medrol  10:00 











Dietary Evaluation Review


Comments:  


Encourage high fiber foods, consider Renal diet with 60g protein  


restriction is kidney function worsens.


Expected Outcomes/Goals:  


HAMILTON Alvarado RESIDENT             Mar 10, 2025 12:57

## 2025-03-10 NOTE — DVHPN2
Subjective


Patient denies any symptoms.  Reports that her abdominal pain has improved


Reviewed:  Care Plan, H&P


Changes from previous H/P or p:  No Changes


General:  Per HPI


Respiratory:  Shortness of breath


Gastrointestinal:  Abdominal Pain, Diarrhea





Objective


Vitals





Vital Signs








  Date Time  Temp Pulse Resp B/P (MAP) Pulse Ox O2 Delivery O2 Flow Rate FiO2


 


3/10/25 08:59    95/34    


 


3/10/25 08:44 97.9 57 16  97   





 97.9       


 


3/10/25 07:30      Nasal Cannula* 2 28








Intake/Output











                               Intake and Output 


 


 3/10/25





 07:00


 


Intake Total 1300 ml


 


Output Total 500 ml


 


Balance 800 ml


 


 


 


Intake Oral 1000 ml


 


IV Total 300 ml


 


Output Urine Total 500 ml








General Appearance:  Alert, Oriented X3, Cooperative


HEENT:  Atraumatic, PERRLA


Lungs:  Clear to auscultation, Normal air movement


Cardiovascular:  Normal S1, Normal S2


Abdomen:  Normal bowel sounds, Soft, No hepatospenomegaly, Other (Tenderness 

noted to left lower:)


Musculoskeletal:  Normal sensory function, Normal motor function


Neuro:  Cranial nerves 3-12 NL


Skin:  Dry, Intact


Psych/Mental Status:  Mental status NL, Mood NL


Medications





                               Current Medications








 Medications  Dose


 Ordered  Sig/Kiran


 Route  Start Time


 Stop Time Status Last Admin


Dose Admin


 


 Ondansetron HCl  4 mg  Q4HP  PRN


 IV  3/1/25 11:30


    3/4/25 12:06


4 MG


 


 Acetaminophen  650 mg  Q6HP  PRN


 PO  3/1/25 11:30


     





 


 Rifaximin  550 mg  BID


 PO  3/1/25 22:00


    3/10/25 08:58


550 MG


 


 Sucralfate  0.5 gm  QID


 PO  3/1/25 12:00


    3/10/25 06:17


0.5 GM


 


 Patient Own


 Medication  1 tab  TID


 PO  3/1/25 14:00


    3/10/25 06:18


1 TAB


 


 Spironolactone  25 mg  DAILY


 PO  3/2/25 10:00


    3/10/25 08:58


25 MG


 


 Pantoprazole


 Sodium  40 mg  DAILY@0600


 PO  3/2/25 06:00


    3/10/25 06:17


40 MG


 


 Midodrine  10 mg  TID@0600,1200,1800


 PO  3/3/25 12:00


    3/10/25 05:07


10 MG


 


 Lactulose  30 ml  DAILY


 PO  3/4/25 10:00


    3/10/25 08:59


30 ML


 


 Furosemide  20 mg  DAILY


 IV  3/4/25 10:00


    3/10/25 08:59


20 MG


 


 Nicardipine HCl  250 ml @ 


 50 mls/hr  Q5H


 IV  3/4/25 03:45


   Cancel  





 


 Azithromycin  250 ml @ 


 125 mls/hr  DAILY


 IV  3/4/25 10:30


    3/10/25 09:59


125 MLS/HR


 


 Potassium Chloride  10 meq  DAILY


 PO  3/6/25 10:00


    3/10/25 08:58


10 MEQ


 


 Ceftriaxone Sodium  50 ml @ 


 100 mls/hr  DAILY@09


 IV  3/7/25 09:00


    3/10/25 08:59


100 MLS/HR


 


 Methylprednisolone


 Sodium Succinate  20 mg  DAILY


 IV  3/9/25 10:00


    3/10/25 08:59


20 MG











Laboratory Results


Laboratory Tests


3/8/25 05:58











Chemistry








Test


 3/9/25


13:02


 


Albumin


 3.5 g/dL


(3.2-4.8)


 


Total Protein


 5.9 g/dL


(5.7-8.2)








LFT








Test


 3/9/25


13:02


 


Alanine Aminotransferase (ALT)


 222 U/L (7-40)


H


 


Alkaline Phosphatase


 453 U/L


()  H


 


Aspartate Amino Transferase


(AST) 267 U/L


(13-40)  H


 


Direct Bilirubin


 1.5 mg/dL


(<0.3)  H


 


Total Bilirubin


 2.2 mg/dL


(0.2-1.0)  H








Urinalysis








Test


 3/5/25


22:15


 


Urine Color


 Light-yellow


(Yellow)


 


Urine Clarity Clear (Clear)  


 


Urine pH 5.0 (5.0-9.0)  


 


Urine Specific Gravity


 1.013


(1.001-1.035)


 


Urine Protein


 Negative


(Negative)


 


Urine Ketones


 Trace


(Negative)


 


Urine Blood


 Negative /uL


(Negative)


 


Urine Nitrite


 Negative


(Negative)


 


Urine Bilirubin


 Negative


(Negative)


 


Urine Urobilinogen


 Normal mg/dL


(Negative)


 


Urine Leukocyte Esterase


 Negative /uL


(Negative)


 


Urine RBC


 None seen /hpf


(0 - 4)


 


Urine Microscopic WBC  /HPF (0-5)  


 


Urine Squamous Epithelial


Cells Few /hpf (<5)  





 


Urine Bacteria


 Few /hpf (None


Seen)  H


 


Urine Mucus


 Few (None


Seen)


 


Urine Glucose


 Normal mg/dL


(Normal)








Microbiology





                                  Microbiology








 Date/Time


Source Procedure


Growth Status





 


 3/5/25 09:45


Ascities Fluid Gram Stain - Final


 Complete


 


 3/5/25 09:45


Ascities Fluid Body Fluid Culture - Final


 Complete


 


 3/5/25 06:55


Nose MRSA Screen - Final


 Complete


 


 3/4/25 21:41


Urine - Catheterized Urine Culture - Final


 Complete


 


 3/1/25 11:33


Blood Blood Culture - Final


NO GROWTH AFTER 5 DAYS OF INCUBATION. Complete








Labs and/or images reviewed:  Labs reviewed by me, Image(s) reviewed by me





Assessment/Plan


Assessment/Plan


Impression:


-septic shock


-community-acquired pneumonia, probable Gram-positive/Gram-negative etiology.  


-cirrhosis of the liver with portal hypertension


-abdominal pain


-obesity 


-thrombocytopenia


-leukocytosis 


-rule out gastritis





Plan:


Events:  Patient tolerating oral intake.  Off norepinephrine.  Ambulating with 

physical therapy.  Continues to require O2 supplementation.


-repeat chest x-ray 


-repeat abdominal ultrasound for assessment of ascites


-DVT prophylaxis


-continue midodrine 


-continue antibiotic therapy with Zosyn , add azithromycin


-hold anticoagulation given thrombocytopenia 


-continue diuresis with spironolactone, Lasix


-PPI


-repeat labs in a.m.


-discussed plan of care with patient and daughter who was bedside.  At this time

they wished for the patient to be discharged back home with home health 

services.





Total time spent with patient and family regarding advance care plannin 

minutes.


Total time spent with patient discussing and formulating plan of care: 35 

minutes.





This medical document was created using an electronic medical record system with

Dragon computerized dictation system.  Although this document has been carefully

reviewed, there may still be some phonetic and typographical errors.  These 

areas are purely typographical due to imperfections of the software programs, 

and do not reflect any compromise in the patient's medical care.


Plan discussed with:  Patient, Daughter, Other (RN)


My Orders





                          Orders - YOANDY CASE NP








Procedure Category Date Status





  Time 


 


Chest Xray 1 View XY 3/10/25 Taken





  09:45 











Date of Service:  Mar 10, 2025


Billing Provider:  YOANDY CASE NP


Common Visit Codes:  [id number] INP/OBS CARE(HIGH)


Secondary Visit Codes:  99497-ADVANCED CARE PLAN 30 MINUTES











YOANDY CASE NP            Mar 10, 2025 10:31

## 2025-03-11 NOTE — DVHPN2
Subjective


Patient denies any symptoms.  Reports that her abdominal pain has improved


Reviewed:  Care Plan, H&P


Changes from previous H/P or p:  No Changes


General:  Per HPI


Respiratory:  Shortness of breath


Gastrointestinal:  Abdominal Pain, Diarrhea





Objective


Vitals





Vital Signs








  Date Time  Temp Pulse Resp B/P (MAP) Pulse Ox O2 Delivery O2 Flow Rate FiO2


 


3/11/25 09:34    101/53    


 


3/11/25 09:00 97.6 53 20  93   





 97.6       


 


3/10/25 20:00      Nasal Cannula* 1 24








Intake/Output











                               Intake and Output 


 


 3/11/25





 07:00


 


Intake Total 1650 ml


 


Output Total 1550 ml


 


Balance 100 ml


 


 


 


Intake Oral 1350 ml


 


IV Total 300 ml


 


Output Urine Total 1550 ml


 


# Bowel Movements 1








General Appearance:  Alert, Oriented X3, Cooperative


HEENT:  Atraumatic, PERRLA


Lungs:  Clear to auscultation, Normal air movement


Cardiovascular:  Normal S1, Normal S2


Abdomen:  Normal bowel sounds, Soft, No hepatospenomegaly, Other (Tenderness 

noted to left lower:)


Musculoskeletal:  Normal sensory function, Normal motor function


Neuro:  Cranial nerves 3-12 NL


Skin:  Dry, Intact


Psych/Mental Status:  Mental status NL, Mood NL


Medications





                               Current Medications








 Medications  Dose


 Ordered  Sig/Kiran


 Route  Start Time


 Stop Time Status Last Admin


Dose Admin


 


 Ondansetron HCl  4 mg  Q4HP  PRN


 IV  3/1/25 11:30


    3/4/25 12:06


4 MG


 


 Acetaminophen  650 mg  Q6HP  PRN


 PO  3/1/25 11:30


     





 


 Rifaximin  550 mg  BID


 PO  3/1/25 22:00


    3/11/25 09:32


550 MG


 


 Sucralfate  0.5 gm  QID


 PO  3/1/25 12:00


    3/11/25 05:18


0.5 GM


 


 Patient Own


 Medication  1 tab  TID


 PO  3/1/25 14:00


    3/11/25 05:18


1 TAB


 


 Spironolactone  25 mg  DAILY


 PO  3/2/25 10:00


    3/11/25 09:32


25 MG


 


 Pantoprazole


 Sodium  40 mg  DAILY@0600


 PO  3/2/25 06:00


    3/11/25 05:18


40 MG


 


 Midodrine  10 mg  TID@0600,1200,1800


 PO  3/3/25 12:00


    3/11/25 05:18


10 MG


 


 Lactulose  30 ml  DAILY


 PO  3/4/25 10:00


    3/11/25 09:34


30 ML


 


 Furosemide  20 mg  DAILY


 IV  3/4/25 10:00


    3/11/25 09:34


20 MG


 


 Nicardipine HCl  250 ml @ 


 50 mls/hr  Q5H


 IV  3/4/25 03:45


   Cancel  





 


 Azithromycin  250 ml @ 


 125 mls/hr  DAILY


 IV  3/4/25 10:30


    3/11/25 10:20


125 MLS/HR


 


 Potassium Chloride  10 meq  DAILY


 PO  3/6/25 10:00


    3/11/25 09:33


10 MEQ


 


 Ceftriaxone Sodium  50 ml @ 


 100 mls/hr  DAILY@09


 IV  3/7/25 09:00


    3/11/25 09:34


100 MLS/HR


 


 Methylprednisolone


 Sodium Succinate  40 mg  DAILY


 IV  3/11/25 10:00


    3/11/25 09:33


40 MG











Laboratory Results


Laboratory Tests


3/8/25 05:58








3/10/25 14:42











Urinalysis








Test


 3/5/25


22:15


 


Urine Color


 Light-yellow


(Yellow)


 


Urine Clarity Clear (Clear)  


 


Urine pH 5.0 (5.0-9.0)  


 


Urine Specific Gravity


 1.013


(1.001-1.035)


 


Urine Protein


 Negative


(Negative)


 


Urine Ketones


 Trace


(Negative)


 


Urine Blood


 Negative /uL


(Negative)


 


Urine Nitrite


 Negative


(Negative)


 


Urine Bilirubin


 Negative


(Negative)


 


Urine Urobilinogen


 Normal mg/dL


(Negative)


 


Urine Leukocyte Esterase


 Negative /uL


(Negative)


 


Urine RBC


 None seen /hpf


(0 - 4)


 


Urine Microscopic WBC  /HPF (0-5)  


 


Urine Squamous Epithelial


Cells Few /hpf (<5)  





 


Urine Bacteria


 Few /hpf (None


Seen)  H


 


Urine Mucus


 Few (None


Seen)


 


Urine Glucose


 Normal mg/dL


(Normal)








Microbiology





                                  Microbiology








 Date/Time


Source Procedure


Growth Status





 


 3/5/25 09:45


Ascities Fluid Gram Stain - Final


 Complete


 


 3/5/25 09:45


Ascities Fluid Body Fluid Culture - Final


 Complete


 


 3/5/25 06:55


Nose MRSA Screen - Final


 Complete


 


 3/4/25 21:41


Urine - Catheterized Urine Culture - Final


 Complete


 


 3/1/25 11:33


Blood Blood Culture - Final


NO GROWTH AFTER 5 DAYS OF INCUBATION. Complete








Labs and/or images reviewed:  Labs reviewed by me, Image(s) reviewed by me





Assessment/Plan


Assessment/Plan


Impression:


-septic shock


-community-acquired pneumonia, probable Gram-positive/Gram-negative etiology.  


-cirrhosis of the liver with portal hypertension


-abdominal pain


-obesity 


-thrombocytopenia


-leukocytosis 


-rule out gastritis





Plan:


Events:  Abdominal ultrasound with minimal ascites.  Repeat chest x-ray with 

small bilateral pleural effusions.  Now on nasal cannula at 1 liter/minute.  

Ambulating approximately 20 ft.  Discussed plan of care with patient and 

daughter was bedside.  All questions answered


-DVT prophylaxis


-continue midodrine 


-continue antibiotic therapy with Zosyn , add azithromycin


-hold anticoagulation given thrombocytopenia 


-continue diuresis with spironolactone, Lasix


-PPI


-repeat labs in a.m.





Total time spent with patient discussing and formulating plan of care: 35 

minutes.





This medical document was created using an electronic medical record system with

Dragon computerized dictation system.  Although this document has been carefully

reviewed, there may still be some phonetic and typographical errors.  These 

areas are purely typographical due to imperfections of the software programs, 

and do not reflect any compromise in the patient's medical care.


Plan discussed with:  Patient, Other (RN)


My Orders





                          Orders - YOANDY CASE NP








Procedure Category Date Status





  Time 


 


Comprehensive LAB 3/12/25 Verified





Metabolic Panel  04:00 











Date of Service:  Mar 11, 2025


Billing Provider:  YOANDY CASE NP


Common Visit Codes:  [id number] INP/OBS CARE(HIGH)











YOANDY CASE NP            Mar 11, 2025 11:28

## 2025-03-11 NOTE — DVHPN2
Progress Note


Date Seen:  Mar 11, 2025


Resident Creating Document:  HAMILTON LAMAS RESIDENT


Medical Necessity Reason


Pt with a Central, PICC or Fol:  No





Subjective


Review of Systems


Patient is clinically much improved, there is less scleral icterus.  Patient is 

awake alert


last BM this AM, soft


reports good appetite


Patient is progressing with PT, notes some dizziness, stable H&H


Patient is off pressors


Patient is getting a low-dose Lasix 20 mg IV daily and also spironolactone 25 mg

p.o. q.h.s. 


Steroids, ursodiol and Protonix on board


Total bilirubin stable at 2.2, however, rise in AST 2-67  and .





Objective


vital signs





                                   Vital Sign








  Date Time  Temp Pulse Resp B/P (MAP) Pulse Ox O2 Delivery O2 Flow Rate FiO2


 


3/11/25 09:34    101/53    


 


3/11/25 09:00 97.6 53 20  93   





 97.6       


 


3/10/25 20:00      Nasal Cannula* 1 24














                           Total Intake and Output   


 


 3/10/25 3/10/25 3/11/25





 15:00 23:00 07:00


 


Intake Total 300 ml 700 ml 650 ml


 


Output Total  650 ml 900 ml


 


Balance 300 ml 50 ml -250 ml








medications





                               Current Medications








 Medications  Dose


 Ordered  Sig/Kiran


 Route  Start Time


 Stop Time Status Last Admin


Dose Admin


 


 Ondansetron HCl  4 mg  Q4HP  PRN


 IV  3/1/25 11:30


    3/4/25 12:06


4 MG


 


 Acetaminophen  650 mg  Q6HP  PRN


 PO  3/1/25 11:30


     





 


 Rifaximin  550 mg  BID


 PO  3/1/25 22:00


    3/11/25 09:32


550 MG


 


 Sucralfate  0.5 gm  QID


 PO  3/1/25 12:00


    3/11/25 05:18


0.5 GM


 


 Patient Own


 Medication  1 tab  TID


 PO  3/1/25 14:00


    3/11/25 05:18


1 TAB


 


 Spironolactone  25 mg  DAILY


 PO  3/2/25 10:00


    3/11/25 09:32


25 MG


 


 Pantoprazole


 Sodium  40 mg  DAILY@0600


 PO  3/2/25 06:00


    3/11/25 05:18


40 MG


 


 Midodrine  10 mg  TID@0600,1200,1800


 PO  3/3/25 12:00


    3/11/25 05:18


10 MG


 


 Lactulose  30 ml  DAILY


 PO  3/4/25 10:00


    3/11/25 09:34


30 ML


 


 Furosemide  20 mg  DAILY


 IV  3/4/25 10:00


    3/11/25 09:34


20 MG


 


 Nicardipine HCl  250 ml @ 


 50 mls/hr  Q5H


 IV  3/4/25 03:45


   Cancel  





 


 Azithromycin  250 ml @ 


 125 mls/hr  DAILY


 IV  3/4/25 10:30


    3/11/25 10:20


125 MLS/HR


 


 Potassium Chloride  10 meq  DAILY


 PO  3/6/25 10:00


    3/11/25 09:33


10 MEQ


 


 Ceftriaxone Sodium  50 ml @ 


 100 mls/hr  DAILY@09


 IV  3/7/25 09:00


    3/11/25 09:34


100 MLS/HR


 


 Methylprednisolone


 Sodium Succinate  40 mg  DAILY


 IV  3/11/25 10:00


    3/11/25 09:33


40 MG








Examination


General Appearance:  Cooperative. Well developed. Well nourished.  NAD


Head Exam:  Normal inspection.  Mild scleral icterus noted


Neck Exam:  Normal inspection. Non-tender. Normal alignment


Pulmonary/Respiratory:  Chest non-tender. Clear bilateral breath sounds, no 

crackles, no wheezing.


Cardiovascular/Chest: Regular rate and rhythm.  No murmurs.  No JVD.


Abdominal Exam:  Normal bowel sounds. Soft.  normal abdomen, trace dullness to 

percussion.  no visible veins, Nontender.  No hepatospenomegaly.  No masses


Lower extremities: Negative lower extremity edema


Thoughts/Psych:  Normal thought pattern.  Appropriate mood and affect.  Good 

judgement and insight


Skin Exam:  Normal inspection. Normal color. Warm. Dry


laboratory and microbiology


                                Laboratory Tests


3/10/25 14:42








3/8/25 05:58

















Test


 3/8/25


05:58 Range/Units


 


 


Serum Glucose 121 H   mg/dL








                                  Microbiology








 Date/Time


Source Procedure


Growth Status





 


 3/5/25 09:45


Ascities Fluid Gram Stain - Final


 Complete


 


 3/5/25 09:45


Ascities Fluid Body Fluid Culture - Final


 Complete


 


 3/5/25 06:55


Nose MRSA Screen - Final


 Complete


 


 3/4/25 21:41


Urine - Catheterized Urine Culture - Final


 Complete


 


 3/1/25 11:33


Blood Blood Culture - Final


NO GROWTH AFTER 5 DAYS OF INCUBATION. Complete











Problem List/Assessment/Plan


Problem List/Assessment/Plan


Jaundice 


Autoimmune hepatitis


Ascites


Scleroderma


ANA 


Transaminitis 


Hypertension 


Liver cirrhosis 


Lactic acidosis








Plan: 


Increased IV methylprednisolone to 40 mg as patient LFTs trending upwards after 

bringing methylprednisolone down to 20 mg.


Continue steroids 40 mg until appointment with GI


Continue spironolactone 25 mg p.o. daily, continue rifaximin


Discharge planning as per hospitalist 


Patient will need to be maintained on prednisone, ursodiol 


We could check a TPMT phenotype and if possible give her a trial of Imuran 50 mg

p.o. daily 


Patient can follow up with GI in the outpatient clinic for regular paracentesis,

appointment scheduled for 03/18/2025.  We will try to arrange a sooner 

appointment.


Overall prognosis remains guarded, patient appears to have a good support system








Plan discussed with patient and daughter at bedside


Plan discussed with Dr. Moeller


Plan discussed with:  Patient, Daughter, Other (RN)





My Orders


My Orders





                           Orders - HAMILTON LAMAS








Procedure Category Date Status





  Time 


 


Methylprednisolone PHA 3/11/25 In Process





Sod Succ (Solu Medrol  10:00 


 


Hepatic Panel LAB 3/11/25 Verified





  11:28 











Dietary Evaluation Review


Comments:  


Encourage high fiber foods, consider Renal diet with 60g protein  


restriction is kidney function worsens.


Expected Outcomes/Goals:  


HAMILTON Alvarado RESIDENT             Mar 11, 2025 11:28

## 2025-03-12 NOTE — DVHPN2
Progress Note


Date Seen:  Mar 12, 2025


Resident Creating Document:  HAMILTON LAMAS RESIDENT


Medical Necessity Reason


Pt with a Central, PICC or Fol:  No





Subjective


Review of Systems


Patient is clinically much improved, there is less scleral icterus.  Patient is 

awake alert


last BM this AM, soft


reports good appetite


Patient is progressing with PT, notes some dizziness, stable H&H


Patient is off pressors


Patient is getting a low-dose Lasix 20 mg IV daily and also spironolactone 25 mg

p.o. q.h.s. 


Steroids, ursodiol and Protonix on board





Objective


vital signs





                                   Vital Sign








  Date Time  Temp Pulse Resp B/P (MAP) Pulse Ox O2 Delivery O2 Flow Rate FiO2


 


3/12/25 09:54    97/51    


 


3/12/25 09:09 98.5 65 16  94   





 98.5       


 


3/11/25 20:00      Nasal Cannula* 1 24














                           Total Intake and Output   


 


 3/11/25 3/11/25 3/12/25





 15:00 23:00 07:00


 


Intake Total 300 ml 800 ml 300 ml


 


Output Total  500 ml 650 ml


 


Balance 300 ml 300 ml -350 ml








medications





                               Current Medications








 Medications  Dose


 Ordered  Sig/Kiran


 Route  Start Time


 Stop Time Status Last Admin


Dose Admin


 


 Ondansetron HCl  4 mg  Q4HP  PRN


 IV  3/1/25 11:30


    3/4/25 12:06


4 MG


 


 Acetaminophen  650 mg  Q6HP  PRN


 PO  3/1/25 11:30


     





 


 Rifaximin  550 mg  BID


 PO  3/1/25 22:00


    3/12/25 09:54


550 MG


 


 Sucralfate  0.5 gm  QID


 PO  3/1/25 12:00


    3/12/25 05:34


0.5 GM


 


 Patient Own


 Medication  1 tab  TID


 PO  3/1/25 14:00


    3/12/25 05:35


1 TAB


 


 Spironolactone  25 mg  DAILY


 PO  3/2/25 10:00


    3/12/25 09:55


25 MG


 


 Pantoprazole


 Sodium  40 mg  DAILY@0600


 PO  3/2/25 06:00


    3/12/25 05:34


40 MG


 


 Midodrine  10 mg  TID@0600,1200,1800


 PO  3/3/25 12:00


    3/12/25 05:34


10 MG


 


 Lactulose  30 ml  DAILY


 PO  3/4/25 10:00


    3/12/25 09:54


30 ML


 


 Furosemide  20 mg  DAILY


 IV  3/4/25 10:00


    3/12/25 09:54


20 MG


 


 Nicardipine HCl  250 ml @ 


 50 mls/hr  Q5H


 IV  3/4/25 03:45


   Cancel  





 


 Potassium Chloride  10 meq  DAILY


 PO  3/6/25 10:00


    3/12/25 09:55


10 MEQ


 


 Methylprednisolone


 Sodium Succinate  40 mg  DAILY


 IV  3/11/25 10:00


    3/12/25 09:54


40 MG


 


 Amoxicillin  500 mg  Q8HR


 PO  3/12/25 14:00


     











Examination


General Appearance:  Cooperative. Well developed. Well nourished.  NAD


Head Exam:  Normal inspection.  Mild scleral icterus noted


Neck Exam:  Normal inspection. Non-tender. Normal alignment


Pulmonary/Respiratory:  Chest non-tender. Clear bilateral breath sounds, no 

crackles, no wheezing.


Cardiovascular/Chest: Regular rate and rhythm.  No murmurs.  No JVD.


Abdominal Exam:  Normal bowel sounds. Soft.  normal abdomen, trace dullness to 

percussion.  no visible veins, Nontender.  No hepatospenomegaly.  No masses


Lower extremities: Negative lower extremity edema


Thoughts/Psych:  Normal thought pattern.  Appropriate mood and affect.  Good 

judgement and insight


Skin Exam:  Normal inspection. Normal color. Warm. Dry


laboratory and microbiology


                                Laboratory Tests


3/12/25 04:48








3/10/25 14:42








3/8/25 05:58

















Test


 3/12/25


04:48 Range/Units


 


 


Serum Glucose 144 H   mg/dL








                                  Microbiology








 Date/Time


Source Procedure


Growth Status





 


 3/5/25 09:45


Ascities Fluid Gram Stain - Final


 Complete


 


 3/5/25 09:45


Ascities Fluid Body Fluid Culture - Final


 Complete


 


 3/5/25 06:55


Nose MRSA Screen - Final


 Complete


 


 3/4/25 21:41


Urine - Catheterized Urine Culture - Final


 Complete


 


 3/1/25 11:33


Blood Blood Culture - Final


NO GROWTH AFTER 5 DAYS OF INCUBATION. Complete








Labs and/or images reviewed:  Labs reviewed by me, Image(s) reviewed by me





Problem List/Assessment/Plan


Problem List/Assessment/Plan


Jaundice 


Autoimmune hepatitis


Ascites


Scleroderma


ANA 


Transaminitis 


Hypertension 


Liver cirrhosis 


Lactic acidosis








Plan: 


Increased IV methylprednisolone to 40 mg as patient LFTs trending upwards after 

bringing methylprednisolone down to 20 mg.


Continue steroids 40 mg until appointment with GI


Continue spironolactone 25 mg p.o. daily, continue rifaximin


Discharge planning as per hospitalist 


Patient will need to be maintained on prednisone, ursodiol 


We could check a TPMT phenotype and if possible give her a trial of Imuran 50 mg

p.o. daily 


Patient can follow up with GI in the outpatient clinic for regular paracentesis,

appointment scheduled for 03/18/2025.  We will try to arrange a sooner 

appointment.


Overall prognosis remains guarded, patient appears to have a good support system








Plan discussed with patient and daughter at bedside


Plan discussed with Dr. Moeller


Plan discussed with:  Patient, Other (RN)





My Orders


My Orders





                           Orders - HAMILTON LAMAS








Procedure Category Date Status





  Time 


 


Complete Blood Count LAB 3/13/25 Verified





  04:00 











Dietary Evaluation Review


Comments:  


Encourage high fiber foods, consider Renal diet with 60g protein  


restriction is kidney function worsens.


Expected Outcomes/Goals:  


HAMILTON Alvarado RESIDENT             Mar 12, 2025 12:46

## 2025-03-12 NOTE — DVHPN2
Subjective


Patient denies any symptoms


Reviewed:  Care Plan, H&P


Changes from previous H/P or p:  No Changes


General:  Per HPI


Respiratory:  Shortness of breath


Gastrointestinal:  Abdominal Pain, Diarrhea





Objective


Vitals





Vital Signs








  Date Time  Temp Pulse Resp B/P (MAP) Pulse Ox O2 Delivery O2 Flow Rate FiO2


 


3/12/25 09:54    97/51    


 


3/12/25 09:09 98.5 65 16  94   





 98.5       


 


3/11/25 20:00      Nasal Cannula* 1 24








Intake/Output











                               Intake and Output 


 


 3/12/25





 07:00


 


Intake Total 1400 ml


 


Output Total 1150 ml


 


Balance 250 ml


 


 


 


Intake Oral 1100 ml


 


IV Total 300 ml


 


Output Urine Total 1150 ml


 


# Bowel Movements 1








General Appearance:  Alert, Oriented X3, Cooperative


HEENT:  Atraumatic, PERRLA


Lungs:  Clear to auscultation, Normal air movement


Cardiovascular:  Normal S1, Normal S2


Abdomen:  Normal bowel sounds, Soft, No hepatospenomegaly, Other (Tenderness 

noted to left lower:)


Musculoskeletal:  Normal sensory function, Normal motor function


Neuro:  Cranial nerves 3-12 NL


Skin:  Dry, Intact


Psych/Mental Status:  Mental status NL, Mood NL


Medications





                               Current Medications








 Medications  Dose


 Ordered  Sig/Kiran


 Route  Start Time


 Stop Time Status Last Admin


Dose Admin


 


 Ondansetron HCl  4 mg  Q4HP  PRN


 IV  3/1/25 11:30


    3/4/25 12:06


4 MG


 


 Acetaminophen  650 mg  Q6HP  PRN


 PO  3/1/25 11:30


     





 


 Rifaximin  550 mg  BID


 PO  3/1/25 22:00


    3/12/25 09:54


550 MG


 


 Sucralfate  0.5 gm  QID


 PO  3/1/25 12:00


    3/12/25 05:34


0.5 GM


 


 Patient Own


 Medication  1 tab  TID


 PO  3/1/25 14:00


    3/12/25 05:35


1 TAB


 


 Spironolactone  25 mg  DAILY


 PO  3/2/25 10:00


    3/12/25 09:55


25 MG


 


 Pantoprazole


 Sodium  40 mg  DAILY@0600


 PO  3/2/25 06:00


    3/12/25 05:34


40 MG


 


 Midodrine  10 mg  TID@0600,1200,1800


 PO  3/3/25 12:00


    3/12/25 05:34


10 MG


 


 Lactulose  30 ml  DAILY


 PO  3/4/25 10:00


    3/12/25 09:54


30 ML


 


 Furosemide  20 mg  DAILY


 IV  3/4/25 10:00


    3/12/25 09:54


20 MG


 


 Nicardipine HCl  250 ml @ 


 50 mls/hr  Q5H


 IV  3/4/25 03:45


   Cancel  





 


 Azithromycin  250 ml @ 


 125 mls/hr  DAILY


 IV  3/4/25 10:30


    3/11/25 10:20


125 MLS/HR


 


 Potassium Chloride  10 meq  DAILY


 PO  3/6/25 10:00


    3/12/25 09:55


10 MEQ


 


 Ceftriaxone Sodium  50 ml @ 


 100 mls/hr  DAILY@09


 IV  3/7/25 09:00


    3/12/25 09:44


100 MLS/HR


 


 Methylprednisolone


 Sodium Succinate  40 mg  DAILY


 IV  3/11/25 10:00


    3/12/25 09:54


40 MG











Laboratory Results


Laboratory Tests


3/8/25 05:58








3/10/25 14:42








3/12/25 04:48











Chemistry








Test


 3/11/25


13:19 3/12/25


04:48


 


Albumin


 3.5 g/dL


(3.2-4.8) 3.3 g/dL


(3.2-4.8)


 


Total Protein


 5.8 g/dL


(5.7-8.2) 5.6 g/dL


(5.7-8.2)  L


 


Calcium Level


 


 10.7 mg/dL


(8.7-10.4)  H








LFT








Test


 3/11/25


13:19 3/12/25


04:48


 


Alanine Aminotransferase (ALT)


 182 U/L (7-40)


H 169 U/L (7-40)


H


 


Alkaline Phosphatase


 383 U/L


()  H 387 U/L


()  H


 


Aspartate Amino Transferase


(AST) 136 U/L


(13-40)  H 122 U/L


(13-40)  H


 


Direct Bilirubin


 1.7 mg/dL


(<0.3)  H 





 


Total Bilirubin


 2.4 mg/dL


(0.2-1.0)  H 2.5 mg/dL


(0.2-1.0)  H








Urinalysis








Test


 3/5/25


22:15


 


Urine Color


 Light-yellow


(Yellow)


 


Urine Clarity Clear (Clear)  


 


Urine pH 5.0 (5.0-9.0)  


 


Urine Specific Gravity


 1.013


(1.001-1.035)


 


Urine Protein


 Negative


(Negative)


 


Urine Ketones


 Trace


(Negative)


 


Urine Blood


 Negative /uL


(Negative)


 


Urine Nitrite


 Negative


(Negative)


 


Urine Bilirubin


 Negative


(Negative)


 


Urine Urobilinogen


 Normal mg/dL


(Negative)


 


Urine Leukocyte Esterase


 Negative /uL


(Negative)


 


Urine RBC


 None seen /hpf


(0 - 4)


 


Urine Microscopic WBC  /HPF (0-5)  


 


Urine Squamous Epithelial


Cells Few /hpf (<5)  





 


Urine Bacteria


 Few /hpf (None


Seen)  H


 


Urine Mucus


 Few (None


Seen)


 


Urine Glucose


 Normal mg/dL


(Normal)








Microbiology





                                  Microbiology








 Date/Time


Source Procedure


Growth Status





 


 3/5/25 09:45


Ascities Fluid Gram Stain - Final


 Complete


 


 3/5/25 09:45


Ascities Fluid Body Fluid Culture - Final


 Complete


 


 3/5/25 06:55


Nose MRSA Screen - Final


 Complete


 


 3/4/25 21:41


Urine - Catheterized Urine Culture - Final


 Complete


 


 3/1/25 11:33


Blood Blood Culture - Final


NO GROWTH AFTER 5 DAYS OF INCUBATION. Complete








Labs and/or images reviewed:  Labs reviewed by me, Image(s) reviewed by me





Assessment/Plan


Assessment/Plan


Impression:


-septic shock


-community-acquired pneumonia, probable Gram-positive/Gram-negative etiology.  


-cirrhosis of the liver with portal hypertension


-abdominal pain


-obesity 


-thrombocytopenia


-leukocytosis 


-rule out gastritis





Plan:


Events:  Blood pressure has been stabilized.  Patient was assessed sitting on 

chair.  Apparently, patient was daughter was able to transition her to chair.  

Physical therapy reports ambulation only to 30 ft.  DME:  identified needs 

include shower chair and walker.


-DVT prophylaxis


-continue midodrine 


-stop IV antibiotics, switched to oral


-hold anticoagulation given thrombocytopenia 


-continue diuresis with spironolactone, Lasix


-PPI


-social service consultation for discharge planning including home PT and DME as

previously mentioned.





Total time spent with patient discussing and formulating plan of care: 35 

minutes.





This medical document was created using an electronic medical record system with

Dragon computerized dictation system.  Although this document has been carefully

reviewed, there may still be some phonetic and typographical errors.  These 

areas are purely typographical due to imperfections of the software programs, 

and do not reflect any compromise in the patient's medical care.


Plan discussed with:  Patient, Other (RN)


My Orders





                          Orders - YOANDY CASE NP








Procedure Category Date Status





  Time 


 


Transfer Orders XFER 3/11/25 Transmitted





  11:53 


 


Dme: Walker DME 3/12/25 Transmitted





  11:28 


 


*  CONS 3/12/25 Transmitted





Consult   











Date of Service:  Mar 12, 2025


Billing Provider:  YOANDY CASE NP


Common Visit Codes:  59647-[id number] INP/OBS CARE(HIGH)











YOANDY CASE NP            Mar 12, 2025 11:34

## 2025-03-13 NOTE — DVHPN2
Subjective


Patient denies any symptoms


Reviewed:  Care Plan, H&P


Changes from previous H/P or p:  No Changes


General:  Per HPI


Respiratory:  Shortness of breath


Gastrointestinal:  Abdominal Pain, Diarrhea





Objective


Vitals





Vital Signs








  Date Time  Temp Pulse Resp B/P (MAP) Pulse Ox O2 Delivery O2 Flow Rate FiO2


 


3/13/25 10:28    110/68    


 


3/13/25 08:42 98.2 71 16  91   





 98.2       


 


3/12/25 20:00      Nasal Cannula* 1 24








Intake/Output











                               Intake and Output 


 


 3/13/25





 07:00


 


Intake Total 1000 ml


 


Output Total 700 ml


 


Balance 300 ml


 


 


 


Intake Oral 1000 ml


 


Output Urine Total 700 ml


 


# Bowel Movements 1








General Appearance:  Alert, Oriented X3, Cooperative


HEENT:  Atraumatic, PERRLA


Lungs:  Clear to auscultation, Normal air movement


Cardiovascular:  Normal S1, Normal S2


Abdomen:  Normal bowel sounds, Soft, No hepatospenomegaly, Other (Tenderness 

noted to left lower:)


Musculoskeletal:  Normal sensory function, Normal motor function


Neuro:  Cranial nerves 3-12 NL


Skin:  Dry, Intact


Psych/Mental Status:  Mental status NL, Mood NL


Medications





                               Current Medications








 Medications  Dose


 Ordered  Sig/Kiran


 Route  Start Time


 Stop Time Status Last Admin


Dose Admin


 


 Ondansetron HCl  4 mg  Q4HP  PRN


 IV  3/1/25 11:30


    3/4/25 12:06


4 MG


 


 Acetaminophen  650 mg  Q6HP  PRN


 PO  3/1/25 11:30


     





 


 Rifaximin  550 mg  BID


 PO  3/1/25 22:00


    3/13/25 10:29


550 MG


 


 Sucralfate  0.5 gm  QID


 PO  3/1/25 12:00


    3/13/25 05:26


0.5 GM


 


 Patient Own


 Medication  1 tab  TID


 PO  3/1/25 14:00


    3/13/25 05:25


1 TAB


 


 Spironolactone  25 mg  DAILY


 PO  3/2/25 10:00


    3/13/25 10:29


25 MG


 


 Pantoprazole


 Sodium  40 mg  DAILY@0600


 PO  3/2/25 06:00


    3/13/25 05:26


40 MG


 


 Midodrine  10 mg  TID@0600,1200,1800


 PO  3/3/25 12:00


    3/13/25 05:26


10 MG


 


 Lactulose  30 ml  DAILY


 PO  3/4/25 10:00


    3/12/25 09:54


30 ML


 


 Furosemide  20 mg  DAILY


 IV  3/4/25 10:00


    3/13/25 10:28


20 MG


 


 Nicardipine HCl  250 ml @ 


 50 mls/hr  Q5H


 IV  3/4/25 03:45


   Cancel  





 


 Potassium Chloride  10 meq  DAILY


 PO  3/6/25 10:00


    3/13/25 10:29


10 MEQ


 


 Methylprednisolone


 Sodium Succinate  40 mg  DAILY


 IV  3/11/25 10:00


    3/13/25 10:28


40 MG


 


 Amoxicillin  500 mg  Q8HR


 PO  3/12/25 14:00


    3/13/25 05:26


500 MG











Laboratory Results


Laboratory Tests


3/12/25 04:48








3/13/25 04:49











Chemistry








Test


 3/13/25


04:49


 


Albumin Pending  


 


Total Protein Pending  








LFT








Test


 3/13/25


04:49


 


Alanine Aminotransferase (ALT) Pending  


 


Alkaline Phosphatase Pending  


 


Aspartate Amino Transferase


(AST) Pending  





 


Direct Bilirubin Pending  


 


Total Bilirubin Pending  








Urinalysis








Test


 3/5/25


22:15


 


Urine Color


 Light-yellow


(Yellow)


 


Urine Clarity Clear (Clear)  


 


Urine pH 5.0 (5.0-9.0)  


 


Urine Specific Gravity


 1.013


(1.001-1.035)


 


Urine Protein


 Negative


(Negative)


 


Urine Ketones


 Trace


(Negative)


 


Urine Blood


 Negative /uL


(Negative)


 


Urine Nitrite


 Negative


(Negative)


 


Urine Bilirubin


 Negative


(Negative)


 


Urine Urobilinogen


 Normal mg/dL


(Negative)


 


Urine Leukocyte Esterase


 Negative /uL


(Negative)


 


Urine RBC


 None seen /hpf


(0 - 4)


 


Urine Microscopic WBC  /HPF (0-5)  


 


Urine Squamous Epithelial


Cells Few /hpf (<5)  





 


Urine Bacteria


 Few /hpf (None


Seen)  H


 


Urine Mucus


 Few (None


Seen)


 


Urine Glucose


 Normal mg/dL


(Normal)








Microbiology





                                  Microbiology








 Date/Time


Source Procedure


Growth Status





 


 3/5/25 09:45


Ascities Fluid Gram Stain - Final


 Complete


 


 3/5/25 09:45


Ascities Fluid Body Fluid Culture - Final


 Complete


 


 3/5/25 06:55


Nose MRSA Screen - Final


 Complete


 


 3/4/25 21:41


Urine - Catheterized Urine Culture - Final


 Complete


 


 3/1/25 11:33


Blood Blood Culture - Final


NO GROWTH AFTER 5 DAYS OF INCUBATION. Complete








Labs and/or images reviewed:  Labs reviewed by me, Image(s) reviewed by me





Assessment/Plan


Assessment/Plan


Impression:


-septic shock


-community-acquired pneumonia, probable Gram-positive/Gram-negative etiology.  


-cirrhosis of the liver with portal hypertension


-abdominal pain


-obesity 


-thrombocytopenia


-leukocytosis 


-rule out gastritis





Plan:


Events:  Right lower lung atelectasis noted.  Bilateral pleural effusions noted.

 Patient has marginal ambulation.  Discussed findings with patient and daughter.


-start bronchodilators with Mucomyst in addition to EzPAP.


-DVT prophylaxis


-continue midodrine 


-stop IV antibiotics, switched to oral


-hold anticoagulation given thrombocytopenia 


-continue diuresis with spironolactone, Lasix


-PPI


-social service consultation for discharge planning including home PT and DME as

previously mentioned.





Total time spent with patient discussing and formulating plan of care: 35 

minutes.





This medical document was created using an electronic medical record system with

Dragon computerized dictation system.  Although this document has been carefully

reviewed, there may still be some phonetic and typographical errors.  These 

areas are purely typographical due to imperfections of the software programs, 

and do not reflect any compromise in the patient's medical care.


Plan discussed with:  Patient, Daughter, Other (RN)


My Orders





                          Orders - YOANDY CASE NP








Procedure Category Date Status





  Time 


 


Dme: Walker DME 3/12/25 Transmitted





  11:28 


 


*  CONS 3/12/25 Transmitted





Consult   


 


Amoxicillin Capsule PHA 3/12/25 In Process





  14:00 


 


*  CONS 3/13/25 Transmitted





Consult   


 


Chest Xray 1 View XY 3/13/25 Taken





  09:35 


 


Chest Ultrasound US 3/13/25 Taken





  09:53 


 


Albuterol Medneb PHA 3/13/25 Transmitted





 (Ventolin Medneb)  12:00 


 


Ipratropium Medneb PHA 3/13/25 Transmitted





 (Atrovent Medneb)  12:00 


 


Ez-Pap RT 3/13/25 Transmitted





  10:39 


 


Chest Portable XY 3/14/25 Transmitted





  04:00 


 


Acetylcysteine PHA 3/13/25 Transmitted





Inhalation 10%  12:00 











Date of Service:  Mar 13, 2025


Billing Provider:  YOANDY CASE NP


Common Visit Codes:  [id number] INP/OBS CARE(HIGH)











YOANDY CASE NP            Mar 13, 2025 10:45

## 2025-03-13 NOTE — DVHPNRES
Progress Note


Date Seen:  Mar 13, 2025


Resident Creating Document:  HAMILTON LAMAS RESIDENT


Medical Necessity Reason


Pt with a Central, PICC or Fol:  No





Subjective


Review of Systems


Patient was seen and examined at bedside


Denies any active ongoing pain or discomfort


reports good appetite


Patient is progressing with PT, stable H&H


Patient is off pressors


Patient is getting a low-dose Lasix 20 mg IV daily and also spironolactone 25 mg

p.o. q.h.s. 


Steroids, ursodiol and Protonix on board





Objective


vital signs





                                   Vital Sign








  Date Time  Temp Pulse Resp B/P (MAP) Pulse Ox O2 Delivery O2 Flow Rate FiO2


 


3/13/25 10:28    110/68    


 


3/13/25 08:42 98.2 71 16  91   





 98.2       


 


3/12/25 20:00      Nasal Cannula* 1 24














                           Total Intake and Output   


 


 3/12/25 3/12/25 3/13/25





 15:00 23:00 07:00


 


Intake Total  600 ml 400 ml


 


Output Total  350 ml 350 ml


 


Balance  250 ml 50 ml








medications





                               Current Medications








 Medications  Dose


 Ordered  Sig/Kiran


 Route  Start Time


 Stop Time Status Last Admin


Dose Admin


 


 Ondansetron HCl  4 mg  Q4HP  PRN


 IV  3/1/25 11:30


    3/4/25 12:06


4 MG


 


 Acetaminophen  650 mg  Q6HP  PRN


 PO  3/1/25 11:30


     





 


 Rifaximin  550 mg  BID


 PO  3/1/25 22:00


    3/13/25 10:29


550 MG


 


 Sucralfate  0.5 gm  QID


 PO  3/1/25 12:00


    3/13/25 05:26


0.5 GM


 


 Patient Own


 Medication  1 tab  TID


 PO  3/1/25 14:00


    3/13/25 05:25


1 TAB


 


 Spironolactone  25 mg  DAILY


 PO  3/2/25 10:00


    3/13/25 10:29


25 MG


 


 Pantoprazole


 Sodium  40 mg  DAILY@0600


 PO  3/2/25 06:00


    3/13/25 05:26


40 MG


 


 Midodrine  10 mg  TID@0600,1200,1800


 PO  3/3/25 12:00


    3/13/25 05:26


10 MG


 


 Lactulose  30 ml  DAILY


 PO  3/4/25 10:00


    3/12/25 09:54


30 ML


 


 Furosemide  20 mg  DAILY


 IV  3/4/25 10:00


    3/13/25 10:28


20 MG


 


 Nicardipine HCl  250 ml @ 


 50 mls/hr  Q5H


 IV  3/4/25 03:45


   Cancel  





 


 Potassium Chloride  10 meq  DAILY


 PO  3/6/25 10:00


    3/13/25 10:29


10 MEQ


 


 Methylprednisolone


 Sodium Succinate  40 mg  DAILY


 IV  3/11/25 10:00


    3/13/25 10:28


40 MG


 


 Amoxicillin  500 mg  Q8HR


 PO  3/12/25 14:00


    3/13/25 05:26


500 MG


 


 Albuterol  2.5 mg  Q6HWA


 NEB  3/13/25 12:00


   UNV  





 


 Ipratropium


 Bromide  0.5 mg  Q6HWA


 NEB  3/13/25 12:00


   UNV  





 


 Acetylcysteine  100 mg  Q6HR


 NEB  3/13/25 12:00


 3/14/25 06:01 UNV  











Examination


General Appearance:  Cooperative. Well developed. Well nourished.  NAD


Head Exam:  Normal inspection.  Mild scleral icterus noted


Neck Exam:  Normal inspection. Non-tender. Normal alignment


Pulmonary/Respiratory:  Chest non-tender. Clear bilateral breath sounds, no 

crackles, no wheezing.


Cardiovascular/Chest: Regular rate and rhythm.  No murmurs.  No JVD.


Abdominal Exam:  Normal bowel sounds. Soft.  normal abdomen, trace dullness to 

percussion.  no visible veins, Nontender.  No hepatospenomegaly.  No masses


Lower extremities: Negative lower extremity edema


Thoughts/Psych:  Normal thought pattern.  Appropriate mood and affect.  Good 

judgement and insight


Skin Exam:  Normal inspection. Normal color. Warm. Dry


laboratory and microbiology


                                Laboratory Tests


3/13/25 04:49








3/12/25 04:48

















Test


 3/12/25


04:48 Range/Units


 


 


Serum Glucose 144 H   mg/dL








                                  Microbiology








 Date/Time


Source Procedure


Growth Status





 


 3/5/25 09:45


Ascities Fluid Gram Stain - Final


 Complete


 


 3/5/25 09:45


Ascities Fluid Body Fluid Culture - Final


 Complete


 


 3/5/25 06:55


Nose MRSA Screen - Final


 Complete


 


 3/4/25 21:41


Urine - Catheterized Urine Culture - Final


 Complete


 


 3/1/25 11:33


Blood Blood Culture - Final


NO GROWTH AFTER 5 DAYS OF INCUBATION. Complete








Labs and/or images reviewed:  Labs reviewed by me, Image(s) reviewed by me





Problem List/Assessment/Plan


Problem List/Assessment/Plan


Jaundice 


Autoimmune hepatitis


Ascites


Scleroderma


ANA 


Transaminitis 


Hypertension 


Liver cirrhosis 


Lactic acidosis








Plan: 


Increased IV methylprednisolone to 40 mg as patient LFTs trending upwards after 

bringing methylprednisolone down to 20 mg.


Continue steroids 40 mg until appointment with GI


Continue spironolactone 25 mg p.o. daily, continue rifaximin


Discharge planning as per hospitalist 


Patient will need to be maintained on prednisone, ursodiol 


We could check a TPMT phenotype and if possible give her a trial of Imuran 50 mg

p.o. daily 


Patient can follow up with GI in the outpatient clinic for regular paracentesis,

appointment scheduled for 03/18/2025. 


Overall prognosis remains guarded, patient appears to have a good support system








Plan discussed with patient and daughter at bedside


Plan discussed with Dr. Moeller


Plan discussed with:  Patient, Daughter, Other (RN)





Dietary Evaluation Review


Comments:  


Encourage high fiber foods, consider Renal diet with 60g protein  


restriction is kidney function worsens.


Expected Outcomes/Goals:  


HAMILTON Alvarado RESIDENT             Mar 13, 2025 10:55

## 2025-03-13 NOTE — DVH
Bilateral Chest Sonogram



Date: 03/13/2025 09:58 AM



Clinical history:   RLL effusion



Technique: Limited sonographic evaluation of the  bilateral chest was performed to evaluate for pleur
al effusion.



Finding/Impression: 



Small right pleural effusion.  Trace left pleural effusion.  Incidentally seen small to moderate volu
me ascites.



Electronically Signed by: Princess Daniel at 03/13/2025 11:02:42 AM

## 2025-03-13 NOTE — DVH
EXAM: XY CHEST XRAY 1 VIEW



Indication: pna



Technique: Single frontal view of the chest was obtained



Comparison: XY CHEST XRAY 1 VIEW on DOS: 3/10/25, XY CHEST XRAY 1 VIEW on DOS: 3/2/25, XY CHEST XRAY 
1 VIEW on DOS: 2/4/25, XY CHEST PORTABLE on DOS: 2/2/25, XY CHEST PORTABLE on DOS: 2/1/25



FINDINGS: 



Lines and Tubes: Left central venous catheter tip projects over the superior vena cava.



Lungs: Right pleural effusion.



Small right basilar opacity.



No pneumothorax. 



Cardiomediastinal contours: Unremarkable



Bones: No acute osseous abnormality.



IMPRESSION:



Small right pleural effusion and right basilar opacity. Low lung volumes.



Electronically Signed by: Princess Daniel at 03/13/2025 10:54:12 AM

## 2025-03-14 NOTE — DVHPN2
Progress Note


Date Seen:  Mar 14, 2025


Resident Creating Document:  HAMILTON LAMAS RESIDENT


Medical Necessity Reason


Pt with a Central, PICC or Fol:  No





Subjective


Review of Systems


Patient was seen and examined at bedside


Notes increasing cough that started yesterday, chest x-ray showed slightly 

increased pulmonary vascular congestion


Denies any active ongoing pain or discomfort


reports good appetite


Passed bowel movement this a.m., denies any nausea vomiting


Patient is progressing with PT, stable H&H


Patient is off pressors


Patient is getting a low-dose Lasix 20 mg IV daily and also spironolactone 25 mg

p.o. q.h.s. 


Steroids, ursodiol and Protonix on board





Objective


vital signs





                                   Vital Sign








  Date Time  Temp Pulse Resp B/P (MAP) Pulse Ox O2 Delivery O2 Flow Rate FiO2


 


3/14/25 09:00 97.6 104 20 98/51 (67) 87   





 97.6       


 


3/14/25 05:50      Room Air* 0 21














                           Total Intake and Output   


 


 3/13/25 3/13/25 3/14/25





 15:00 23:00 07:00


 


Intake Total  400 ml 300 ml


 


Output Total  400 ml 250 ml


 


Balance  0 ml 50 ml








medications





                               Current Medications








 Medications  Dose


 Ordered  Sig/Kiran


 Route  Start Time


 Stop Time Status Last Admin


Dose Admin


 


 Ondansetron HCl  4 mg  Q4HP  PRN


 IV  3/1/25 11:30


    3/4/25 12:06


4 MG


 


 Acetaminophen  650 mg  Q6HP  PRN


 PO  3/1/25 11:30


     





 


 Rifaximin  550 mg  BID


 PO  3/1/25 22:00


    3/14/25 10:54


550 MG


 


 Sucralfate  0.5 gm  QID


 PO  3/1/25 12:00


    3/14/25 06:29


0.5 GM


 


 Patient Own


 Medication  1 tab  TID


 PO  3/1/25 14:00


    3/14/25 06:30


1 TAB


 


 Spironolactone  25 mg  DAILY


 PO  3/2/25 10:00


    3/13/25 10:29


25 MG


 


 Pantoprazole


 Sodium  40 mg  DAILY@0600


 PO  3/2/25 06:00


    3/14/25 06:29


40 MG


 


 Midodrine  10 mg  TID@0600,1200,1800


 PO  3/3/25 12:00


    3/14/25 06:29


10 MG


 


 Lactulose  30 ml  DAILY


 PO  3/4/25 10:00


    3/14/25 10:54


30 ML


 


 Furosemide  20 mg  DAILY


 IV  3/4/25 10:00


    3/13/25 10:28


20 MG


 


 Nicardipine HCl  250 ml @ 


 50 mls/hr  Q5H


 IV  3/4/25 03:45


   Cancel  





 


 Potassium Chloride  10 meq  DAILY


 PO  3/6/25 10:00


    3/14/25 10:54


10 MEQ


 


 Amoxicillin  500 mg  Q8HR


 PO  3/12/25 14:00


    3/14/25 06:29


500 MG


 


 Albuterol  2.5 mg  Q6HWA


 [facility]  3/13/25 12:00


    3/14/25 05:48


2.5 MG


 


 Ipratropium


 Bromide  0.5 mg  Q6HWA


 [facility]  3/13/25 12:00


    3/14/25 05:49


0.5 MG


 


 Methylprednisolone


 Sodium Succinate  40 mg  DAILY


 IV  3/15/25 10:40


     











Examination


General Appearance:  Cooperative. Well developed. Well nourished.  NAD


Head Exam:  Normal inspection.  Mild scleral icterus noted


Neck Exam:  Normal inspection. Non-tender. Normal alignment


Pulmonary/Respiratory:  Chest non-tender. Clear bilateral breath sounds, no 

crackles, no wheezing.


Cardiovascular/Chest: Regular rate and rhythm.  No murmurs.  No JVD.


Abdominal Exam:  Normal bowel sounds. Soft but distended.  normal abdomen, 

dullness to percussion.  no visible veins, Nontender.  No hepatospenomegaly.  No

masses


Lower extremities: Negative lower extremity edema


Thoughts/Psych:  Normal thought pattern.  Appropriate mood and affect.


Skin Exam:  Normal inspection. Normal color. Warm. Dry


laboratory and microbiology


                                Laboratory Tests


3/14/25 10:30








3/13/25 04:49








3/12/25 04:48

















Test


 3/12/25


04:48 Range/Units


 


 


Serum Glucose 144 H   mg/dL








                                  Microbiology








 Date/Time


Source Procedure


Growth Status





 


 3/5/25 09:45


Ascities Fluid Gram Stain - Final


 Complete


 


 3/5/25 09:45


Ascities Fluid Body Fluid Culture - Final


 Complete


 


 3/5/25 06:55


Nose MRSA Screen - Final


 Complete


 


 3/4/25 21:41


Urine - Catheterized Urine Culture - Final


 Complete


 


 3/1/25 11:33


Blood Blood Culture - Final


NO GROWTH AFTER 5 DAYS OF INCUBATION. Complete











Problem List/Assessment/Plan


Problem List/Assessment/Plan


Jaundice 


Autoimmune hepatitis


Ascites


Scleroderma


ANA 


Transaminitis 


Hypertension 


Liver cirrhosis 


Lactic acidosis








Plan: 


CT chest showed right lower lobe atelectasis and/or airspace disease.  Small 

bilateral pleural effusions.  Moderate volume abdominal ascites and hepatic 

cirrhosis 


Ordered abdominal ultrasound to evaluate for ascites 


Radiology consulted for possible paracentesis before discharge


Increased IV methylprednisolone to 40 mg as patient LFTs trending upwards after 

bringing methylprednisolone down to 20 mg.


Continue steroids 40 mg until appointment with GI


Continue spironolactone 25 mg p.o. daily, continue rifaximin


Discharge planning as per hospitalist 


Patient will need to be maintained on prednisone, ursodiol 


We could check a TPMT phenotype and if possible give her a trial of Imuran 50 mg

p.o. daily 


Patient can follow up with GI in the outpatient clinic for regular paracentesis,

appointment scheduled for 03/18/2025. 


Overall prognosis remains guarded, patient appears to have a good support system








Plan discussed with patient and daughter at bedside


Plan discussed with Dr. Moeller


Plan discussed with:  Patient, Daughter, Other (RN)





My Orders


My Orders





                           Orders - HAMILTON LAMAS RESIDENT








Procedure Category Date Status





  Time 


 


Methylprednisolone PHA 3/15/25 In Process





Sod Succ (Solu Medrol  10:40 











Dietary Evaluation Review


Comments:  


Encourage high fiber foods, consider Renal diet with 60g protein  


restriction is kidney function worsens.


Expected Outcomes/Goals:  


HAMILTON Alvarado RESIDENT             Mar 14, 2025 11:06

## 2025-03-14 NOTE — DVH
US PARACENTESIS, 



HISTORY: ASCITES



PROCEDURE: Informed consent was obtained. The patient was placed in supine position. A limited locali
zation ultrasound of the abdomen was obtained, and the skin site over the largest pocket of fluid was
 marked and entry site was prepped with chlorhexidine which was allowed to dry and draped in the usua
l sterile fashion. Time out was performed. Following administration of 1% lidocaine local anesthetic,
 a 5 Portuguese centesis needle catheter was percutaneously inserted into the peritoneal collection until
 fluid was aspirated. The catheter was advanced into the fluid collection and the needle removed. Abo
ut 3000 cc of fluid was aspirated . The catheter was then removed and a sterile dressing applied.  No
 immediate complication was identified.



FINDINGS: Limited ultrasound imaging demonstrates mild ascites. Aspirated fluid was clear and serous.




IMPRESSION: 



US-guided paracentesis with 3L removed.



Electronically Signed by: Wilver Blanco at 03/14/2025 13:18:44 PM

## 2025-03-14 NOTE — DVH
Procedure: CT CHEST WITHOUT CONTRAST



Reason for study/Clinical History:   mucous plugging, RLL atelectasis



Comparison Study: Chest radiograph dated 03/14/2025



TECHNIQUE: Multidetector CT of the chest was performed from the lung apices to the upper abdomen with
out the use of intravenous contract. Axial, coronal and sagittal multiplanar reformats were performed
.



Radiation Dose Information: CT Dose: CTDI volume is 10.29 mGy. Dose-length product is 333.81 mGy*cm



The dose indicators for CT are the volume Computed Tomography (CT) Dose Index (CTDIvol) and the Dose 
Length Product (DLP), and are measured in units of mGy and mGy-cm, respectively. These indicators are
 not patient dose, but values generated from the CT scanner acquisition factors. The report includes 
radiation exposure data for exposures received during this examination. 



FINDINGS: Lower neck: Unremarkable.



Lungs: Right lower lobe airspace disease.



Heart/Vascular Structures: Cardiomegaly.



Lymph Nodes: No adenopathy



Pleura: Small bilateral pleural effusions, right-greater-than-left.



Musculoskeletal: No acute osseous abnormality.



Soft tissues: Normal.



Upper abdomen: Moderate ascites, partially imaged. Hepatic cirrhosis. Post cholecystectomy.



IMPRESSION: 



Right lower lobe atelectasis and/or airspace disease.



Small bilateral pleural effusions, right-greater-than-left.



Moderate volume abdominal ascites, partially imaged and Hepatic cirrhosis.  



Radiation optimization: All CT scans at this facility use at least one of these dose optimization dank
hniques: automated exposure control  mA and/or kV adjustment per patient size (includes targeted exam
s where dose is matched to clinical indication)  or iterative reconstruction.



Electronically Signed by: Phi Reis at 03/14/2025 10:18:00 AM

## 2025-03-14 NOTE — DVH
CHEST RADIOGRAPH



Indication: atelectasis



Technique: Single frontal view of the chest was obtained



COMPARISON: XY CHEST XRAY 1 VIEW on DOS: 3/13/25, XY CHEST XRAY 1 VIEW on DOS: 3/10/25, XY CHEST XRAY
 1 VIEW on DOS: 3/2/25, XY CHEST XRAY 1 VIEW on DOS: 2/4/25, XY CHEST PORTABLE on DOS: 2/2/25



FINDINGS: 



Lines and Tubes: Left central venous catheter in satisfactory position.



Lungs: Congestion



Pleura: Small right pleural effusion.



No pneumothorax. 



Cardiomediastinal contours: Unremarkable



Bones: Unremarkable



IMPRESSION: 



Pulmonary vascular congestion, slightly increased. 



Electronically Signed by: Phi Reis at 03/14/2025 09:20:15 AM

## 2025-03-14 NOTE — DVH
ULTRASOUND ABDOMEN limited, 4 QUADRANTS



INDICATION: evaluate ascites



Evaluate for ascites.



TECHNIQUE: 



The four quadrants of the abdomen were scanned in grey-scale to assess for the presence of ascites. N
o solid organ assessment was performed.



FINDINGS/IMPRESSIONS: 



Moderate volume ascites.



Electronically Signed by: Princess Daniel at 03/14/2025 11:41:50 AM

## 2025-03-14 NOTE — DVHPN2
Subjective


Patient denies any symptoms


Reviewed:  Care Plan, H&P


Changes from previous H/P or p:  No Changes


General:  Per HPI


Respiratory:  Shortness of breath


Gastrointestinal:  Abdominal Pain, Diarrhea





Objective


Vitals





Vital Signs








  Date Time  Temp Pulse Resp B/P (MAP) Pulse Ox O2 Delivery O2 Flow Rate FiO2


 


3/14/25 06:01  68 18  99   


 


3/14/25 05:50      Room Air* 0 21


 


3/14/25 05:00 97.7   107/56 (73)    





 97.7       








Intake/Output











                               Intake and Output 


 


 3/14/25





 07:00


 


Intake Total 700 ml


 


Output Total 650 ml


 


Balance 50 ml


 


 


 


Intake Oral 700 ml


 


Output Urine Total 650 ml


 


# Bowel Movements 2








General Appearance:  Alert, Oriented X3, Cooperative


HEENT:  Atraumatic, PERRLA


Lungs:  Clear to auscultation, Normal air movement


Cardiovascular:  Normal S1, Normal S2


Abdomen:  Normal bowel sounds, Soft, No hepatospenomegaly, Other (Tenderness 

noted to left lower:)


Musculoskeletal:  Normal sensory function, Normal motor function


Extremities:  Normal pulses, No tenderness/swelling


Neuro:  Cranial nerves 3-12 NL


Skin:  Dry, Intact


Psych/Mental Status:  Mental status NL, Mood NL


Medications





                               Current Medications








 Medications  Dose


 Ordered  Sig/Kiran


 Route  Start Time


 Stop Time Status Last Admin


Dose Admin


 


 Ondansetron HCl  4 mg  Q4HP  PRN


 IV  3/1/25 11:30


    3/4/25 12:06


4 MG


 


 Acetaminophen  650 mg  Q6HP  PRN


 PO  3/1/25 11:30


     





 


 Rifaximin  550 mg  BID


 PO  3/1/25 22:00


    3/13/25 21:57


550 MG


 


 Sucralfate  0.5 gm  QID


 PO  3/1/25 12:00


    3/14/25 06:29


0.5 GM


 


 Patient Own


 Medication  1 tab  TID


 PO  3/1/25 14:00


    3/14/25 06:30


1 TAB


 


 Spironolactone  25 mg  DAILY


 PO  3/2/25 10:00


    3/13/25 10:29


25 MG


 


 Pantoprazole


 Sodium  40 mg  DAILY@0600


 PO  3/2/25 06:00


    3/14/25 06:29


40 MG


 


 Midodrine  10 mg  TID@0600,1200,1800


 PO  3/3/25 12:00


    3/14/25 06:29


10 MG


 


 Lactulose  30 ml  DAILY


 PO  3/4/25 10:00


    3/12/25 09:54


30 ML


 


 Furosemide  20 mg  DAILY


 IV  3/4/25 10:00


    3/13/25 10:28


20 MG


 


 Nicardipine HCl  250 ml @ 


 50 mls/hr  Q5H


 IV  3/4/25 03:45


   Cancel  





 


 Potassium Chloride  10 meq  DAILY


 PO  3/6/25 10:00


    3/13/25 10:29


10 MEQ


 


 Methylprednisolone


 Sodium Succinate  40 mg  DAILY


 IV  3/11/25 10:00


    3/13/25 10:28


40 MG


 


 Amoxicillin  500 mg  Q8HR


 PO  3/12/25 14:00


    3/14/25 06:29


500 MG


 


 Albuterol  2.5 mg  Q6HWA


 [facility]  3/13/25 12:00


    3/14/25 05:48


2.5 MG


 


 Ipratropium


 Bromide  0.5 mg  Q6HWA


 [facility]  3/13/25 12:00


    3/14/25 05:49


0.5 MG











Laboratory Results


Laboratory Tests


3/12/25 04:48








3/13/25 04:49











Urinalysis








Test


 3/5/25


22:15


 


Urine Color


 Light-yellow


(Yellow)


 


Urine Clarity Clear (Clear)  


 


Urine pH 5.0 (5.0-9.0)  


 


Urine Specific Gravity


 1.013


(1.001-1.035)


 


Urine Protein


 Negative


(Negative)


 


Urine Ketones


 Trace


(Negative)


 


Urine Blood


 Negative /uL


(Negative)


 


Urine Nitrite


 Negative


(Negative)


 


Urine Bilirubin


 Negative


(Negative)


 


Urine Urobilinogen


 Normal mg/dL


(Negative)


 


Urine Leukocyte Esterase


 Negative /uL


(Negative)


 


Urine RBC


 None seen /hpf


(0 - 4)


 


Urine Microscopic WBC  /HPF (0-5)  


 


Urine Squamous Epithelial


Cells Few /hpf (<5)  





 


Urine Bacteria


 Few /hpf (None


Seen)  H


 


Urine Mucus


 Few (None


Seen)


 


Urine Glucose


 Normal mg/dL


(Normal)








Microbiology





                                  Microbiology








 Date/Time


Source Procedure


Growth Status





 


 3/5/25 09:45


Ascities Fluid Gram Stain - Final


 Complete


 


 3/5/25 09:45


Ascities Fluid Body Fluid Culture - Final


 Complete


 


 3/5/25 06:55


Nose MRSA Screen - Final


 Complete


 


 3/4/25 21:41


Urine - Catheterized Urine Culture - Final


 Complete


 


 3/1/25 11:33


Blood Blood Culture - Final


NO GROWTH AFTER 5 DAYS OF INCUBATION. Complete








Labs and/or images reviewed:  Labs reviewed by me, Image(s) reviewed by me





Assessment/Plan


Assessment/Plan


Impression:


-septic shock


-community-acquired pneumonia, probable Gram-positive/Gram-negative etiology.  


-cirrhosis of the liver with portal hypertension


-abdominal pain


-obesity 


-thrombocytopenia


-leukocytosis 


-rule out gastritis





Plan:


Events:  No events overnight.  Repeat chest x-ray this a.m. reveals no 

improvement with right lower lung.  CT scan to be performed.  Discussed with 

primary nurse.


-bronchodilators, Mucomyst with EzPAP.  Add chest percussion and drainage


-DVT prophylaxis


-continue midodrine 


-stop IV antibiotics, switched to oral


-hold anticoagulation given thrombocytopenia 


-continue diuresis with spironolactone, Lasix


-PPI


-social service consultation for discharge planning including home PT and DME as

previously mentioned.





Total time spent with patient discussing and formulating plan of care: 35 

minutes.





This medical document was created using an electronic medical record system with

Dragon computerized dictation system.  Although this document has been carefully

reviewed, there may still be some phonetic and typographical errors.  These 

areas are purely typographical due to imperfections of the software programs, 

and do not reflect any compromise in the patient's medical care.


Plan discussed with:  Patient, Daughter, Other (RN)


My Orders





                          Orders - YOANDY CASE NP








Procedure Category Date Status





  Time 


 


*  CONS 3/13/25 Transmitted





Consult   


 


Chest Xray 1 View XY 3/13/25 Resulted





  09:35 


 


Chest Ultrasound US 3/13/25 Resulted





  09:53 


 


Albuterol Medneb PHA 3/13/25 In Process





 (Ventolin Medneb)  12:00 


 


Ipratropium Medneb PHA 3/13/25 In Process





 (Atrovent Medneb)  12:00 


 


Ez-Pap RT 3/13/25 Transmitted





  10:39 


 


Chest Portable XY 3/14/25 Taken





  04:00 


 


Ipratropium Medneb PHA 3/13/25 In Process





 (Atrovent Medneb)  09:52 


 


Chest Without Contrast CT 3/14/25 Logged





  08:30 


 


Chest Percussion Tx RT 3/14/25 Logged





Initi  08:30 











Date of Service:  Mar 14, 2025


Billing Provider:  YOANDY CASE NP


Common Visit Codes:  [id number] INP/OBS CARE(HIGH)











YOANDY CASE NP            Mar 14, 2025 08:36

## 2025-03-15 NOTE — DVHPN2
Subjective


Patient denies any symptoms


Reviewed:  Care Plan, H&P


Changes from previous H/P or p:  No Changes


General:  Per HPI


Respiratory:  Shortness of breath


Gastrointestinal:  Abdominal Pain, Diarrhea





Objective


Vitals





Vital Signs








  Date Time  Temp Pulse Resp B/P (MAP) Pulse Ox O2 Delivery O2 Flow Rate FiO2


 


3/15/25 16:40 97.5 101 17 98/69 (79) 90   





 97.5       


 


3/15/25 12:40      Room Air 0.0 


 


3/15/25 12:40        21








Intake/Output











                               Intake and Output 


 


 3/15/25





 07:00


 


Intake Total 400 ml


 


Output Total 550 ml


 


Balance -150 ml


 


 


 


Intake Oral 400 ml


 


Output Urine Total 550 ml








General Appearance:  Alert, Oriented X3, Cooperative


HEENT:  Atraumatic, PERRLA


Lungs:  Clear to auscultation, Normal air movement


Cardiovascular:  Normal S1, Normal S2


Abdomen:  Normal bowel sounds, Soft, No hepatospenomegaly, Other (Tenderness 

noted to left lower:)


Musculoskeletal:  Normal sensory function, Normal motor function


Extremities:  Normal pulses, No tenderness/swelling


Neuro:  Cranial nerves 3-12 NL


Skin:  Dry, Intact


Psych/Mental Status:  Mental status NL, Mood NL


Medications





                               Current Medications








 Medications  Dose


 Ordered  Sig/Kiran


 Route  Start Time


 Stop Time Status Last Admin


Dose Admin


 


 Ondansetron HCl  4 mg  Q4HP  PRN


 IV  3/1/25 11:30


    3/4/25 12:06


4 MG


 


 Acetaminophen  650 mg  Q6HP  PRN


 PO  3/1/25 11:30


     





 


 Rifaximin  550 mg  BID


 PO  3/1/25 22:00


    3/15/25 09:23


550 MG


 


 Sucralfate  0.5 gm  QID


 PO  3/1/25 12:00


    3/15/25 13:09


0.5 GM


 


 Patient Own


 Medication  1 tab  TID


 PO  3/1/25 14:00


    3/15/25 13:19


1 TAB


 


 Spironolactone  25 mg  DAILY


 PO  3/2/25 10:00


    3/15/25 09:23


25 MG


 


 Pantoprazole


 Sodium  40 mg  DAILY@0600


 PO  3/2/25 06:00


    3/15/25 06:21


40 MG


 


 Midodrine  10 mg  TID@0600,1200,1800


 PO  3/3/25 12:00


    3/15/25 13:09


10 MG


 


 Lactulose  30 ml  DAILY


 PO  3/4/25 10:00


    3/14/25 10:54


30 ML


 


 Furosemide  20 mg  DAILY


 IV  3/4/25 10:00


    3/15/25 09:25


20 MG


 


 Nicardipine HCl  250 ml @ 


 50 mls/hr  Q5H


 IV  3/4/25 03:45


   Cancel  





 


 Potassium Chloride  10 meq  DAILY


 PO  3/6/25 10:00


    3/15/25 09:23


10 MEQ


 


 Amoxicillin  500 mg  Q8HR


 PO  3/12/25 14:00


    3/15/25 13:19


500 MG


 


 Albuterol  2.5 mg  Q6HWA


 [facility]  3/13/25 12:00


    3/15/25 12:40


2.5 MG


 


 Ipratropium


 Bromide  0.5 mg  Q6HWA


 [facility]  3/13/25 12:00


    3/15/25 12:40


0.5 MG


 


 Methylprednisolone


 Sodium Succinate  40 mg  DAILY


 IV  3/15/25 10:40


    3/15/25 09:23


40 MG











Laboratory Results


Laboratory Tests


3/12/25 04:48








3/13/25 04:49








3/14/25 10:30











Urinalysis








Test


 3/5/25


22:15


 


Urine Color


 Light-yellow


(Yellow)


 


Urine Clarity Clear (Clear)  


 


Urine pH 5.0 (5.0-9.0)  


 


Urine Specific Gravity


 1.013


(1.001-1.035)


 


Urine Protein


 Negative


(Negative)


 


Urine Ketones


 Trace


(Negative)


 


Urine Blood


 Negative /uL


(Negative)


 


Urine Nitrite


 Negative


(Negative)


 


Urine Bilirubin


 Negative


(Negative)


 


Urine Urobilinogen


 Normal mg/dL


(Negative)


 


Urine Leukocyte Esterase


 Negative /uL


(Negative)


 


Urine RBC


 None seen /hpf


(0 - 4)


 


Urine Microscopic WBC  /HPF (0-5)  


 


Urine Squamous Epithelial


Cells Few /hpf (<5)  





 


Urine Bacteria


 Few /hpf (None


Seen)  H


 


Urine Mucus


 Few (None


Seen)


 


Urine Glucose


 Normal mg/dL


(Normal)








Microbiology





                                  Microbiology








 Date/Time


Source Procedure


Growth Status





 


 3/5/25 09:45


Ascities Fluid Gram Stain - Final


 Complete


 


 3/5/25 09:45


Ascities Fluid Body Fluid Culture - Final


 Complete


 


 3/5/25 06:55


Nose MRSA Screen - Final


 Complete


 


 3/4/25 21:41


Urine - Catheterized Urine Culture - Final


 Complete


 


 3/1/25 11:33


Blood Blood Culture - Final


NO GROWTH AFTER 5 DAYS OF INCUBATION. Complete








Labs and/or images reviewed:  Labs reviewed by me, Image(s) reviewed by me





Assessment/Plan


Assessment/Plan


Impression:


-septic shock


-community-acquired pneumonia, probable Gram-positive/Gram-negative etiology.  


-cirrhosis of the liver with portal hypertension


-abdominal pain


-obesity 


-thrombocytopenia


-leukocytosis 


-rule out gastritis





Plan:


Events:  No events overnight.  DC central line, DC Land.  Continue oral 

antibiotics.  Discharge once patient was ambulating.  Paracentesis with 3 L 

removed yesterday.


-bronchodilators, Mucomyst with EzPAP.  Add chest percussion and drainage


-DVT prophylaxis


-continue midodrine 


-stop IV antibiotics, switched to oral


-hold anticoagulation given thrombocytopenia 


-continue diuresis with spironolactone, Lasix


-PPI


-social service consultation for discharge planning including home PT and DME as

previously mentioned.





Total time spent with patient discussing and formulating plan of care: 35 

minutes.





This medical document was created using an electronic medical record system with

Dragon computerized dictation system.  Although this document has been carefully

reviewed, there may still be some phonetic and typographical errors.  These 

areas are purely typographical due to imperfections of the software programs, 

and do not reflect any compromise in the patient's medical care.


Plan discussed with:  Patient, Other (RN)





Date of Service:  Mar 15, 2025


Billing Provider:  YOANDY CASE NP


Common Visit Codes:  67250-[id number] INP/OBS CARE(HIGH)


Procedure Codes:  99152-MODERATE SEDATION INITIAL 15











YOANDY CASE NP            Mar 15, 2025 16:44

## 2025-03-19 NOTE — DVHDS2
Discharge Summary


Date of Admission


Mar 1, 2025 at 11:28





Date of Discharge:


Mar 15, 2025





Admitting Diagnosis


Acute hypoxic respiratory failure





Labs/Diagnostic Data:





                               Laboratory Results








Test


 3/14/25


10:30 3/13/25


04:49 3/12/25


04:48 3/8/25


05:58


 


Hemoglobin


 13.7 g/dL


(12.2-16.2) 


 


 





 


Hematocrit


 41.6 %


(36.0-46.0) 


 


 





 


White Blood Count


 


 7.6 10^3/uL


(4.4-10.8) 


 





 


Red Blood Count


 


 4.54 10^6/uL


(4.0-5.20) 


 





 


Mean Corpuscular Volume


 


 89.2 fL


(80.0-100.0) 


 





 


Mean Corpuscular Hemoglobin


 


 29.2 pg


(28.0-32.0) 


 





 


Mean Corpuscular Hemoglobin


Concent 


 32.7 g/dL


(32.0-36.0) 


 





 


Red Cell Distribution Width


 


 21.3 %


(11.8-14.3) 


 





 


Platelet Count


 


 67 10^3/uL


(140-450) 


 





 


Mean Platelet Volume


 


 8.1 fL


(6.9-10.8) 


 





 


Neutrophils (%) (Auto)   % (37.0-80.0)   


 


Lymphocytes (%) (Auto)   % (10.0-50.0)   


 


Monocytes (%) (Auto)   % (0.0-12.0)   


 


Basophils (%) (Auto)   % (0.0-2.0)   


 


Neutrophils # (Auto)


 


 10 ^3/uL


(1.6-8.6) 


 





 


Lymphocytes # (Auto)


 


 10 ^3/uL


(0.4-5.4) 


 





 


Monocytes # (Auto)


 


 10 ^3/uL


(0-1.3) 


 





 


Differential Total Cells


Counted 


 100.0 (100) 


 


 





 


Neutrophils % (Manual)  85 (37.0-80.0)   


 


Band Neutrophils % (Manual)  0   


 


Lymphocytes % (Manual)  6 (10.0-50.0)   


 


Monocytes % (Manual)  9 (0-12)   


 


Eosinophils % (Manual)  0 (0-7)   


 


Basophils % (Manual)  0 (0.0-2.0)   


 


Metamyelocytes % (manual)  0   


 


Myelocytes % (Manual)  0   


 


Promyelocytes % (Manual)  0   


 


Blast Cells % (Manual)  0   


 


Reactive Lymphocytes  0   


 


Platelet Estimate  Decreased   


 


Total Bilirubin


 


 2.7 mg/dL


(0.2-1.0) 


 





 


Direct Bilirubin


 


 2.0 mg/dL


(<0.3) 


 





 


Aspartate Amino Transferase


(AST) 


 105 U/L


(13-40) 


 





 


Alanine Aminotransferase (ALT)  153 U/L (7-40)   


 


Alkaline Phosphatase


 


 376 U/L


() 


 





 


Total Protein


 


 5.6 g/dL


(5.7-8.2) 


 





 


Albumin


 


 3.3 g/dL


(3.2-4.8) 


 





 


Sodium Level


 


 


 131 mmol/L


(136-145) 





 


Potassium Level


 


 


 3.7 mmol/L


(3.5-5.1) 





 


Chloride Level


 


 


 97 mmol/L


() 





 


Carbon Dioxide Level


 


 


 23 mmol/L


(20-31) 





 


Anion Gap   11 (5-15)  


 


Blood Urea Nitrogen


 


 


 41 mg/dL


(9-23) 





 


Creatinine


 


 


 1.84 mg/dL


(0.550-1.02) 





 


Glomerular Filtration Rate


Calc 


 


 30 mL/min


(>90) 





 


BUN/Creatinine Ratio


 


 


 22.3


(10.0-20.0) 





 


Serum Glucose


 


 


 144 mg/dL


() 





 


Calcium Level


 


 


 10.7 mg/dL


(8.7-10.4) 





 


Eosinophils (%) (Auto)


 


 


 


 0.0 %


(0.0-7.0)


 


Eosinophils # (Auto)


 


 


 


 0 10 ^3/uL


(0-0.8)


 


Basophils # (Auto)


 


 


 


 0 10 ^3/uL


(0-0.2)


 


Nucleated Red Blood Cells    0.2 % 


 


Test


 3/6/25


04:57 3/5/25


22:15 3/5/25


09:45 3/2/25


18:27


 


Magnesium Level


 2.0 mg/dL


(1.6-2.6) 


 


 





 


Thyroid Stimulating Hormone


(TSH) 0.26 uIU/mL


(0.55-4.78) 


 


 





 


Free Thyroxine (T4) Calculated


 0.92 ng/dL


(0.89-1.76) 


 


 





 


Free Triiodothyronine (T3)


pg/mL 1.29 pg/mL


(2.3-4.2) 


 


 





 


Urine Color


 


 Light-yellow


(Yellow) 


 





 


Urine Clarity  Clear (Clear)   


 


Urine pH  5.0 (5.0-9.0)   


 


Urine Specific Gravity


 


 1.013


(1.001-1.035) 


 





 


Urine Protein


 


 Negative


(Negative) 


 





 


Urine Ketones


 


 Trace


(Negative) 


 





 


Urine Blood


 


 Negative /uL


(Negative) 


 





 


Urine Nitrite


 


 Negative


(Negative) 


 





 


Urine Bilirubin


 


 Negative


(Negative) 


 





 


Urine Urobilinogen


 


 Normal mg/dL


(Negative) 


 





 


Urine Leukocyte Esterase


 


 Negative /uL


(Negative) 


 





 


Urine RBC


 


 None seen /hpf


(0 - 4) 


 





 


Urine Microscopic WBC   /HPF (0-5)   


 


Urine Squamous Epithelial


Cells 


 Few /hpf (<5) 


 


 





 


Urine Bacteria


 


 Few /hpf (None


Seen) 


 





 


Urine Mucus


 


 Few (None


Seen) 


 





 


Urine Glucose


 


 Normal mg/dL


(Normal) 


 





 


Body Fluid Source   Ascities fluid  


 


Body Fluid pH   8.0  


 


Body Fluid WBC (Manual)


 


 


 310 CUMM


(0-200) 





 


Body Fluid RBC (Manual)


 


 


 10 CUMM


(0-2000) 





 


Body Fluid Mononuclear Cells   84 %  


 


Body Fluid Polymorphonuclear


Cells 


 


 16 % (0-25) 


 





 


Body Fluid Glucose   157 mg/dL (.)  


 


Body Fluid Total Protein   0.5 g/dL (.)  


 


Body Fluid Lactate


Dehydrogenase 


 


 50 IU/L (.) 


 





 


Body Fluid Albumin


 


 


 


 <0.2 g/dL (Not


Estab.)


 


Test


 3/2/25


07:53 3/1/25


12:05 3/1/25


11:33 





 


POC Glucose


 100 mg/dl


() 


 


 





 


Lactic Acid Level


 


 2.1 mmol/L


(0.4-2.0) 


 





 


Ammonia


 


 < 10 umol/L


(11-32) 


 





 


Prothrombin Time


 


 


 11.9 sec


(9.3-11.8) 





 


Prothrombin Time INR


 


 


 1.14


(0.9-1.15) 





 


Activated Partial


Thromboplast Time 


 


 26.2 SEC


(24.5-34.5) 





 


Amylase Level


 


 


 97 U/L


() 





 


Lipase   58 U/L (12-53)  





                             Other Laboratory Tests


3/14/25 10:30








3/13/25 04:49








3/12/25 04:48











Brief Hx & Hospital Course:


History of Present Illness


Mahnaz Mckenna is a 68-year-old female with past medical history of liver 

cirrhosis autoimmune, kidney failure, and bilateral DVTs with IVC filter placed 

who presents to the ED with abdominal pain, distention, diarrhea, and shortness 

of breath.  Daughter at bedside Mi states that she was at [facility] for

5 days and had a paracentesis with 4 L out.  Prior to that she was here at 

[facility] for 2 weeks and had 2 paracentesis with 4 L out each time with a 

total of 8 L out.  Patient's daughter Mi states that she has a total of 3 

paracentesis with the 1st being at [facility].  Patient's daughter also 

reports that she does not use home oxygen but upon examination patient is on 

oxygen.  Patient's daughter also reports that her blood pressure has been very 

low and she has been giving her midodrine with no change.  Patient's daughter 

also reports that the patient has been lethargic and has lost her appetite the 

last day.  Patient is not currently complaining of chest pain, shortness of 

breath, fever, chills, nausea, and vomiting.





Course of hospitalization:


Patient was treated with empiric IV antibiotic therapy.  She had multiple 

paracentesis while in the hospital.  Patient was started on midodrine for blood 

pressure support.  Patient has been ambulating, tolerating oral diuretics.  

Patient had medication reconciliation performed, with continuation of all home 

medications, with the addition of prednisone 40 mg p.o. daily x2 weeks as well 

as Augmentin 875 mg for five days.  Patient has had appropriate oral intake.  

Long discussion was made with the daughter regarding discharge planning.  

Patient will be discharged home with physical therapy, as well as follow up with

the discharge Clinic in one week.  All parties are agreeable with discharge 

plan.





Physical exam


General: Alert and Oriented x3. No acute distress. Well-nourished.


Eyes: EOMI. Anicteric.


HENT: Moist mucous membranes.


Lungs: Clear to auscultation bilaterally. No accessory muscle use.


Cardiovascular: Regular rate and rhythm. No murmur. No JVD.


Abdomen: Soft, non-tender and non-distended. No palpable masses.


Extremities: No edema. Non-tender.


Skin: No rashes or lesions. Warm.


Neurologic: No focal neurological deficits. CN II-XII grossly intact, but not 

individually tested.


Psychiatric: Cooperative. Appropriate mood and affect.





Total time spent with patient discussing and formulating plan of care: 35 

minutes.





This medical document was created using an electronic medical record system with

Dragon computerized dictation system.  Although this document has been carefully

reviewed, there may still be some phonetic and typographical errors.  These 

areas are purely typographical due to imperfections of the software programs, 

and do not reflect any compromise in the patient's medical care.





Consults/Reason for consult


Gastroenterology:  Cirrhosis





Condition at Discharge:


Guarded





Final Diagnosis/Problems List





Acute hypoxic respiratory failure





Secondary diagnosis: 


-septic shock


-community-acquired pneumonia, probable Gram-positive/Gram-negative


etiology.


-cirrhosis of the liver with portal hypertension


-abdominal pain


-obesity 


-thrombocytopenia


-leukocytosis 


-ruled out gastritis


-acute kidney injury


-ascites





Discharge Disposition:


Home with Health Services





Discharge Instruct/Medications


Diet:  Cardiac 2g Na,low cholest


Activity:  No Restrictions, As Tolerated


Follow Up/Referral:  


PCP in 1-2 weeks


Medications:  


Continue all home medications





875 mg p.o. b.i.d. x4 days


36


Discharge Statement:


"Patient was advised to return to the ER or call 911 if any headaches, 

dizziness, shortness of breath, chest pain, abdominal pain, bleeding, fevers, or

worsening of medical condition.





Patient was counseled about treatment plan, medications, possible side effects, 

patientverbalized understanding. All questions were answered to the best of my 

ability.





This discharge took greater then 30 minutes in planning, reviewing 

documentation, counseling the patient, and discussing with other team members."


DME: Diagnosis:  


pneumonia, cirrhosis





ASSESSMENT


Acute hypoxic respiratory failure





Date of Service:  Mar 19, 2025


Billing Provider:  YOANDY CASE NP


Common Visit Codes:  64498-VQV/OBS DISCH DAY >30min











YOANDY CASE NP            Mar 19, 2025 17:20